# Patient Record
Sex: FEMALE | Race: WHITE | Employment: PART TIME
[De-identification: names, ages, dates, MRNs, and addresses within clinical notes are randomized per-mention and may not be internally consistent; named-entity substitution may affect disease eponyms.]

---

## 2017-06-24 ENCOUNTER — HEALTH MAINTENANCE LETTER (OUTPATIENT)
Age: 42
End: 2017-06-24

## 2017-07-31 ENCOUNTER — OFFICE VISIT (OUTPATIENT)
Dept: FAMILY MEDICINE | Facility: CLINIC | Age: 42
End: 2017-07-31
Payer: COMMERCIAL

## 2017-07-31 ENCOUNTER — MYC MEDICAL ADVICE (OUTPATIENT)
Dept: FAMILY MEDICINE | Facility: CLINIC | Age: 42
End: 2017-07-31

## 2017-07-31 VITALS
WEIGHT: 134.5 LBS | OXYGEN SATURATION: 98 % | HEART RATE: 86 BPM | RESPIRATION RATE: 16 BRPM | TEMPERATURE: 98.9 F | SYSTOLIC BLOOD PRESSURE: 113 MMHG | HEIGHT: 68 IN | DIASTOLIC BLOOD PRESSURE: 79 MMHG | BODY MASS INDEX: 20.38 KG/M2

## 2017-07-31 DIAGNOSIS — F41.9 ANXIETY: ICD-10-CM

## 2017-07-31 DIAGNOSIS — N92.0 MENORRHAGIA WITH REGULAR CYCLE: Primary | ICD-10-CM

## 2017-07-31 DIAGNOSIS — N92.0 MENORRHAGIA WITH REGULAR CYCLE: ICD-10-CM

## 2017-07-31 DIAGNOSIS — G47.00 INSOMNIA, UNSPECIFIED TYPE: ICD-10-CM

## 2017-07-31 DIAGNOSIS — Z23 ENCOUNTER FOR IMMUNIZATION: ICD-10-CM

## 2017-07-31 DIAGNOSIS — Z00.00 ROUTINE GENERAL MEDICAL EXAMINATION AT A HEALTH CARE FACILITY: Primary | ICD-10-CM

## 2017-07-31 PROCEDURE — 99213 OFFICE O/P EST LOW 20 MIN: CPT | Mod: 25 | Performed by: FAMILY MEDICINE

## 2017-07-31 PROCEDURE — 90471 IMMUNIZATION ADMIN: CPT | Performed by: FAMILY MEDICINE

## 2017-07-31 PROCEDURE — 99396 PREV VISIT EST AGE 40-64: CPT | Mod: 25 | Performed by: FAMILY MEDICINE

## 2017-07-31 PROCEDURE — 90715 TDAP VACCINE 7 YRS/> IM: CPT | Performed by: FAMILY MEDICINE

## 2017-07-31 NOTE — NURSING NOTE
Screening Questionnaire for Adult Immunization    Are you sick today?   No   Do you have allergies to medications, food, a vaccine component or latex?   No   Have you ever had a serious reaction after receiving a vaccination?   No   Do you have a long-term health problem with heart disease, lung disease, asthma, kidney disease, metabolic disease (e.g. diabetes), anemia, or other blood disorder?   No   Do you have cancer, leukemia, HIV/AIDS, or any other immune system problem?   No   In the past 3 months, have you taken medications that affect  your immune system, such as prednisone, other steroids, or anticancer drugs; drugs for the treatment of rheumatoid arthritis, Crohn s disease, or psoriasis; or have you had radiation treatments?   No   Have you had a seizure, or a brain or other nervous system problem?   No   During the past year, have you received a transfusion of blood or blood     products, or been given immune (gamma) globulin or antiviral drug?   No   For women: Are you pregnant or is there a chance you could become        pregnant during the next month?   No   Have you received any vaccinations in the past 4 weeks?   No     Immunization questionnaire answers were all negative.      MNVFC doesn't apply on this patient    Per orders of Dr. Roach, injection of Adacel given by Shannon Tena. Patient instructed to remain in clinic for 15 minutes afterwards, and to report any adverse reaction to me immediately.       Screening performed by Shannon Tena on 7/31/2017 at 2:49 PM.

## 2017-07-31 NOTE — PROGRESS NOTES
SUBJECTIVE:   CC: Samantha Meier is an 42 year old woman who presents for preventive health visit.     Physical   Annual:     Getting at least 3 servings of Calcium per day::  Yes    Bi-annual eye exam::  Yes    Dental care twice a year::  NO    Sleep apnea or symptoms of sleep apnea::  None    Diet::  Carbohydrate counting and Gluten-free/reduced    Frequency of exercise::  4-5 days/week    Duration of exercise::  45-60 minutes    Taking medications regularly::  Not Applicable    Additional concerns today::  YES (wants blood work)    Other non-preventive concerns to address:  1)  Would like hormone testing for estradiol and progesterone.  States that a nutritionist would like these to help her plan her diet.  She's seeking nutrition advice relating to anxiety and restless sleep.  She awakens unrefreshed.  She's also had changes in her menstrual pattern.  She used to have a 40-day cycle and now has a 28-day cycle with heavier (but regular) bleeding.  On heavy flow days may need to change pad or tampon every couple hours.      Today's PHQ-2 Score:   PHQ-2 ( 1999 Pfizer) 7/31/2017   Q1: Little interest or pleasure in doing things 1   Q2: Feeling down, depressed or hopeless 1   PHQ-2 Score 2   Q1: Little interest or pleasure in doing things Several days   Q2: Feeling down, depressed or hopeless Several days   PHQ-2 Score 2       Abuse: Current or Past(Physical, Sexual or Emotional)- No  Do you feel safe in your environment - Yes    Social History   Substance Use Topics     Smoking status: Never Smoker     Smokeless tobacco: Never Used     Alcohol use 0.6 - 1.8 oz/week     1 - 3 Standard drinks or equivalent per week      Comment: socially     The patient does not drink >3 drinks per day nor >7 drinks per week.    Reviewed orders with patient.  Reviewed health maintenance and updated orders accordingly - Yes      Pertinent mammograms are reviewed under the imaging tab.  History of abnormal Pap smear:   NO - age 30-65  PAP every 5 years with negative HPV co-testing recommended  Last 3 Pap Results:   PAP (no units)   Date Value   2013 NIL   HPV Negative 2013    Reviewed and updated as needed this visit by clinical staff  Tobacco  Allergies  Meds  Med Hx  Surg Hx  Fam Hx  Soc Hx        Reviewed and updated as needed this visit by Provider  Tobacco  Meds  Med Hx  Surg Hx  Fam Hx  Soc Hx       Past Medical History:   Diagnosis Date     Abnormal Pap smear of cervix     Minor dysplasia s/p cryotherapy     Cat scratch fever     by LN biopsy      Past Surgical History:   Procedure Laterality Date     LYMPH NODE BIOPSY      cat scratch disease     Obstetric History       T1      L1     SAB0   TAB0   Ectopic0   Multiple0   Live Births0       # Outcome Date GA Lbr Kg/2nd Weight Sex Delivery Anes PTL Lv   2 SAB            1 Term                   ROS:  CONST: NEGATIVE for fevers/chills/sweats, unexplained weight loss/gain, and fatigue  EYES: NEGATIVE for change in vision  ENT: NEGATIVE for difficulty hearing/tinnitus, and problems with teeth/gums  BREAST: NEGATIVE for breast lump/discharge  CV: NEGATIVE for chest pain/discomfort, leg pain with exercise, and palpitations  RESP: NEGATIVE for cough/wheeze, and difficulty breathing  GI: NEGATIVE for abdominal pain, blood in bowel movement, and nausea/vomiting/diarrhea  : NEGATIVE for nighttime urination, leaking urine, unusual vaginal bleeding, penile/vaginal discharge, and concerns about sexual function  MS: NEGATIVE for muscle/joint pain  SKIN: NEGATIVE for rash or mole change  NEURO: NEGATIVE for headache, dizziness/lightheadedness, numbness, memory loss, and loss of coordination  PSYCH: POSITIVE for anxiety/stress and problems with sleep, and NEGATIVE for depression  HEME: NEGATIVE for unexplained lumps, and easy bruising/bleeding  ENDO: NEGATIVE for excessive thirst or urination  ALL: NEGATIVE for hay fever/allergies      OBJECTIVE:   BP  "113/79 (BP Location: Left arm, Patient Position: Chair, Cuff Size: Adult Regular)  Pulse 86  Temp 98.9  F (37.2  C) (Oral)  Resp 16  Ht 5' 8\" (1.727 m)  Wt 134 lb 8 oz (61 kg)  LMP 07/20/2017  SpO2 98%  Breastfeeding? No  BMI 20.45 kg/m2  EXAM:  GENERAL: healthy, alert and no distress  EYES: Eyes grossly normal to inspection, PERRL and conjunctivae and sclerae normal  HENT: ear canals and TM's normal, nose and mouth without ulcers or lesions  NECK: no adenopathy, no asymmetry, masses, or scars and thyroid normal to palpation  RESP: lungs clear to auscultation - no rales, rhonchi or wheezes  BREAST: normal without masses, tenderness or nipple discharge and no palpable axillary masses or adenopathy  CV: regular rate and rhythm, normal S1 S2, no S3 or S4, no murmur, click or rub, no peripheral edema and peripheral pulses strong  ABDOMEN: soft, nontender, no hepatosplenomegaly, no masses and bowel sounds normal  MS: no gross musculoskeletal defects noted, no edema  SKIN: no suspicious lesions or rashes  NEURO: Normal strength and tone, mentation intact and speech normal  PSYCH: mentation appears normal, affect normal/bright    ASSESSMENT/PLAN:     1. Routine general medical examination at a health care facility    2. Insomnia, unspecified type  3. Anxiety  Discussed that I'm not familiar with how to interpret estradiol/progesterone levels in this setting.  Discussed that the levels of each of these hormones do vary quite a bit during a cycle and that for fertility testing (to test for ovulatory cycle) these would be done on different days of the menstrual cycle (typically Day 3 for estradiol and Day 21 for progesterone).  We also discussed mental health and she is considering therapy but would want to see a specific therapist based on recommendations.  I did discussed that Mague Matamoros, [facility] [place] [facility], is a mental health resource available to her as well.  She plans to get back to me if " she'd like to pursue the hormone testing.  We may also consider TSH testing.      4. Menorrhagia with regular cycle  Discussed that her bleeding sounds to be within normal range of menstrual variation.  However, I did review that in the case of increasingly heavy or frequent bleeding I would recommend further workup of the endometrium - usually with ultrasound.      5. Encounter for immunization  - TDAP VACCINE (ADACEL)  - ADMIN 1st VACCINE     COUNSELING:  Reviewed preventive health counseling, as reflected in patient instructions      Counseling Resources:  ATP IV Guidelines  Pooled Cohorts Equation Calculator  Breast Cancer Risk Calculator  FRAX Risk Assessment  ICSI Preventive Guidelines  Dietary Guidelines for Americans, 2010  USDA's MyPlate  ASA Prophylaxis  Lung CA Screening    Cynthia Roach MD  [facility] [place]

## 2017-07-31 NOTE — MR AVS SNAPSHOT
After Visit Summary   7/31/2017    Samantha Meier    MRN: [id number]           Patient Information     Date Of Birth          1975        Visit Information        Provider Department      7/31/2017 2:00 PM Cynthia Roach MD [facility]        Today's Diagnoses     Routine general medical examination at a health care facility    -  1    Insomnia, unspecified type        Anxiety        Menorrhagia with regular cycle        Encounter for immunization          Care Instructions      Preventive Health Recommendations  Female Ages 40 to 49    Yearly exam:     See your health care provider every year in order to  1. Review health changes.   2. Discuss preventive care.    3. Review your medicines if your doctor prescribed any.      Get a Pap test every three years (unless you have an abnormal result and your provider advises testing more often).      If you get Pap tests with HPV test, you only need to test every 5 years, unless you have an abnormal result. You do not need a Pap test if your uterus was removed (hysterectomy) and you have not had cancer.      You should be tested each year for STDs (sexually transmitted diseases), if you're at risk.       Ask your doctor if you should have a mammogram.      Have a colonoscopy (test for colon cancer) if someone in your family has had colon cancer or polyps before age 50.       Have a cholesterol test every 5 years.       Have a diabetes test (fasting glucose) after age 45. If you are at risk for diabetes, you should have this test every 3 years.    Shots: Get a flu shot each year. Get a tetanus shot every 10 years.     Nutrition:     Eat at least 5 servings of fruits and vegetables each day.    Eat whole-grain bread, whole-wheat pasta and brown rice instead of white grains and rice.    Talk to your provider about Calcium and Vitamin D.     Lifestyle    Exercise at least 150 minutes a week (an average of 30 minutes a day, 5 days a week). This  "will help you control your weight and prevent disease.    Limit alcohol to one drink per day.    No smoking.     Wear sunscreen to prevent skin cancer.    See your dentist every six months for an exam and cleaning.          Follow-ups after your visit        Who to contact     If you have questions or need follow up information about today's clinic visit or your schedule please contact [facility] directly at [phone].  Normal or non-critical lab and imaging results will be communicated to you by [email or web address], letter or phone within 4 business days after the clinic has received the results. If you do not hear from us within 7 days, please contact the clinic through [email or web address] or phone. If you have a critical or abnormal lab result, we will notify you by phone as soon as possible.  Submit refill requests through [employer] or call your pharmacy and they will forward the refill request to us. Please allow 3 business days for your refill to be completed.          Additional Information About Your Visit        [email or web address] Information     [employer] lets you send messages to your doctor, view your test results, renew your prescriptions, schedule appointments and more. To sign up, go to [email or web address] . Click on \"Log in\" on the left side of the screen, which will take you to the Welcome page. Then click on \"Sign up Now\" on the right side of the page.     You will be asked to enter the access code listed below, as well as some personal information. Please follow the directions to create your username and password.     Your access code is: [email or web address]  Expires: 10/29/2017  2:44 PM     Your access code will  in 90 days. If you need help or a new code, please call your [facility] clinic or [phone].        Care EveryWhere ID     This is your Care EveryWhere ID. This could be used by other organizations to access your [facility] medical records  [id number]        Your Vitals Were     Pulse Temperature " "Respirations Height Last Period Pulse Oximetry    86 98.9  F (37.2  C) (Oral) 16 5' 8\" (1.727 m) 07/20/2017 98%    Breastfeeding? BMI (Body Mass Index)                No 20.45 kg/m2           Blood Pressure from Last 3 Encounters:   07/31/17 136/90   11/18/14 131/88   07/18/13 118/84    Weight from Last 3 Encounters:   07/31/17 134 lb 8 oz (61 kg)   11/18/14 133 lb 6.4 oz (60.5 kg)   07/18/13 129 lb (58.5 kg)              We Performed the Following     TDAP VACCINE (ADACEL)        Primary Care Provider Office Phone # Fax #    Miya Susy Bradshaw [phone]       [facility] [address] [facility] [address]        Equal Access to Services     [facility] : Hadii aad ku hadasho Soomaali, waaxda luqadaha, qaybta kaalmada adeegyada, giovanni baig hayaan saundra wilkins . So [facility] [phone].    ATENCIÓN: Si habla español, tiene a flores disposición servicios gratuitos de asistencia lingüística. Llame al [phone].    We comply with applicable federal civil rights laws and [place] laws. We do not discriminate on the basis of race, color, national origin, age, disability sex, sexual orientation or gender identity.            Thank you!     Thank you for choosing [facility]  for your care. Our goal is always to provide you with excellent care. Hearing back from our patients is one way we can continue to improve our services. Please take a few minutes to complete the written survey that you may receive in the mail after your visit with us. Thank you!             Your Updated Medication List - Protect others around you: Learn how to safely use, store and throw away your medicines at www.disposemymeds.org.      Notice  As of 7/31/2017  2:44 PM    You have not been prescribed any medications.      "

## 2017-07-31 NOTE — TELEPHONE ENCOUNTER
Routing to provider - Doc - please review and advise as appropriate  1. Order request:  Progesterone & Estradiol - on day 21 of menstruation cycle  2. Last office visit 7/31/2017   Menorrhagia with regular cycle - advised within normal range of menstrual variation      Thank you,  Kathleen Whitaker RN

## 2018-09-22 ENCOUNTER — HEALTH MAINTENANCE LETTER (OUTPATIENT)
Age: 43
End: 2018-09-22

## 2018-12-20 ENCOUNTER — OFFICE VISIT (OUTPATIENT)
Dept: FAMILY MEDICINE | Facility: CLINIC | Age: 43
End: 2018-12-20
Payer: COMMERCIAL

## 2018-12-20 VITALS
BODY MASS INDEX: 20.27 KG/M2 | SYSTOLIC BLOOD PRESSURE: 132 MMHG | RESPIRATION RATE: 16 BRPM | HEART RATE: 64 BPM | OXYGEN SATURATION: 100 % | WEIGHT: 133.75 LBS | HEIGHT: 68 IN | DIASTOLIC BLOOD PRESSURE: 88 MMHG | TEMPERATURE: 99 F

## 2018-12-20 DIAGNOSIS — Z00.00 ROUTINE GENERAL MEDICAL EXAMINATION AT A HEALTH CARE FACILITY: Primary | ICD-10-CM

## 2018-12-20 DIAGNOSIS — N63.0 LUMP OR MASS IN BREAST: ICD-10-CM

## 2018-12-20 DIAGNOSIS — Z30.9 ENCOUNTER FOR CONTRACEPTIVE MANAGEMENT, UNSPECIFIED TYPE: ICD-10-CM

## 2018-12-20 PROCEDURE — 99213 OFFICE O/P EST LOW 20 MIN: CPT | Mod: 25 | Performed by: FAMILY MEDICINE

## 2018-12-20 PROCEDURE — 99396 PREV VISIT EST AGE 40-64: CPT | Performed by: FAMILY MEDICINE

## 2018-12-20 PROCEDURE — G0145 SCR C/V CYTO,THINLAYER,RESCR: HCPCS | Performed by: FAMILY MEDICINE

## 2018-12-20 PROCEDURE — 87624 HPV HI-RISK TYP POOLED RSLT: CPT | Performed by: FAMILY MEDICINE

## 2018-12-20 ASSESSMENT — ENCOUNTER SYMPTOMS
BREAST MASS: 1
CONSTIPATION: 0
FREQUENCY: 0
ABDOMINAL PAIN: 0
HEADACHES: 0
JOINT SWELLING: 0
FEVER: 0
DIARRHEA: 0
DYSURIA: 0
PARESTHESIAS: 0
HEMATOCHEZIA: 0
COUGH: 0
MYALGIAS: 0
SHORTNESS OF BREATH: 0
DIZZINESS: 0
CHILLS: 0
ARTHRALGIAS: 0
HEARTBURN: 0
HEMATURIA: 0
NERVOUS/ANXIOUS: 0
EYE PAIN: 0
PALPITATIONS: 0
SORE THROAT: 0
NAUSEA: 0
WEAKNESS: 0

## 2018-12-20 ASSESSMENT — MIFFLIN-ST. JEOR: SCORE: 1320.19

## 2018-12-20 NOTE — PROGRESS NOTES
SUBJECTIVE:   CC: Samantha Meier is an 43 year old woman who presents for preventive health visit.     Physical   Annual:     Getting at least 3 servings of Calcium per day:  Yes    Bi-annual eye exam:  Yes    Dental care twice a year:  NO    Sleep apnea or symptoms of sleep apnea:  None    Diet:  Carbohydrate counting and Gluten-free/reduced    Frequency of exercise:  4-5 days/week    Duration of exercise:  45-60 minutes    Taking medications regularly:  Yes    Medication side effects:  Not applicable    Additional concerns today:  Yes    PHQ-2 Total Score: 1      Medical assistant advised patient about addressing additional health concerns that could be billed, as it is not considered a part of a preventive physical. Patient verbalized understanding.    Cynthia Roach MD on 12/20/2018 at 10:32 AM  Consult on getting IUD    - DECLINE FLU VACCINE       Other non-preventive concerns to address:  1)  Lump in left breast - outer upper breast.  She has a history of fibrocystic changes but this lump feels more prominent.  Has been present for a couple months without change.  No FHX of breast cancer.  Mother had ovarian cancer.      Today's PHQ-2 Score:   PHQ-2 ( 1999 Pfizer) 12/20/2018   Q1: Little interest or pleasure in doing things 0   Q2: Feeling down, depressed or hopeless 0   PHQ-2 Score 0   Q1: Little interest or pleasure in doing things Not at all   Q2: Feeling down, depressed or hopeless Several days   PHQ-2 Score 1       Abuse: Current or Past(Physical, Sexual or Emotional)- No  Do you feel safe in your environment? Yes    Social History     Tobacco Use     Smoking status: Never Smoker     Smokeless tobacco: Never Used   Substance Use Topics     Alcohol use: Yes     Alcohol/week: 0.6 - 1.8 oz     Types: 1 - 3 Standard drinks or equivalent per week     Comment: socially     Alcohol Use 12/20/2018   If you drink alcohol do you typically have greater than 3 drinks per day OR greater than 7 drinks per week?  No       Reviewed orders with patient.  Reviewed health maintenance and updated orders accordingly - Yes  BP Readings from Last 3 Encounters:   12/20/18 132/88   07/31/17 113/79   11/18/14 131/88    Wt Readings from Last 3 Encounters:   12/20/18 60.7 kg (133 lb 12 oz)   07/31/17 61 kg (134 lb 8 oz)   11/18/14 60.5 kg (133 lb 6.4 oz)          Pertinent mammograms are reviewed under the imaging tab.  History of abnormal Pap smear: YES - updated in Problem List and Health Maintenance accordingly  PAP / HPV 7/18/2013   PAP NIL   HPVSUR RESULT Negative  Reference range: Negative  (Note)  INTERPRETIVE INFORMATION: HPV DNA Probe, High Risk, SurePath    The performance characteristics of HPV testing using the  SurePath sample medium were determined by [facility]  in a validation study. The FDA has not approved the  SurePath sample medium for HPV testing. Specimens collected  in SurePath sample medium may produce false-negative  results under certain conditions, e.g., when specimens  exceed stability requirements. For HPV results using an  FDA-approved test, laboratories should collect and  transport specimens according to the instructions of  FDA-approved kits (e.g., ThinPrep medium or HPV Digene  collection kit).    This test detects the high-risk HPV genotypes 16, 18, 31,  33, 35, 39, 45, 51, 52, 56, 58, 59, and 68 associated with  cervical cancer and its precursor lesions. However,  cross-reactions with other genotypes may occur. Results  should be correlated with cytologic and histologic  findings. Sensitivity may be affected by cellularity of  specimen.    This test is intended for medical purposes only and is not  valid for the evaluation of suspected sexual abuse or for  other forensic purposes.    HPV testing should not be used for screening or management  of atypical squamous cells of undetermined significance  (ASCUS) in women under age 21.    Test developed and characteristics determined by  [facility]. See Compliance Statement B: [employer]  Performed by [facility],  [address] [place] [address] [phone]  [email or web address], Maurisio Ryan MD, Lab. Director     Reviewed and updated as needed this visit by clinical staff  Tobacco  Allergies  Meds  Med Hx  Surg Hx  Fam Hx  Soc Hx        Reviewed and updated as needed this visit by Provider  Tobacco  Meds  Med Hx  Surg Hx  Fam Hx  Soc Hx       Past Medical History:   Diagnosis Date     Abnormal Pap smear of cervix     Minor dysplasia s/p cryotherapy     Cat scratch fever     by LN biopsy      Past Surgical History:   Procedure Laterality Date     LYMPH NODE BIOPSY      cat scratch disease     Obstetric History       T1      L1     SAB1   TAB0   Ectopic0   Multiple0   Live Births0       # Outcome Date GA Lbr Kg/2nd Weight Sex Delivery Anes PTL Lv   2 SAB            1 Term                   Review of Systems   Constitutional: Negative for chills and fever.   HENT: Negative for congestion, ear pain, hearing loss and sore throat.    Eyes: Negative for pain and visual disturbance.   Respiratory: Negative for cough and shortness of breath.    Cardiovascular: Negative for chest pain, palpitations and peripheral edema.   Gastrointestinal: Negative for abdominal pain, constipation, diarrhea, heartburn, hematochezia and nausea.   Breasts:  Positive for breast mass. Negative for tenderness and discharge.   Genitourinary: Negative for dysuria, frequency, genital sores, hematuria, pelvic pain, urgency, vaginal bleeding and vaginal discharge.   Musculoskeletal: Negative for arthralgias, joint swelling and myalgias.   Skin: Negative for rash.   Neurological: Negative for dizziness, weakness, headaches and paresthesias.   Psychiatric/Behavioral: Negative for mood changes. The patient is not nervous/anxious.           OBJECTIVE:   /88 (BP Location: Left arm, Patient Position: Chair, Cuff Size: Adult  "Regular)   Pulse 64   Temp 99  F (37.2  C) (Oral)   Resp 16   Ht 1.727 m (5' 8\")   Wt 60.7 kg (133 lb 12 oz)   LMP 12/18/2018 (Exact Date)   SpO2 100%   BMI 20.34 kg/m    Physical Exam  GENERAL: healthy, alert and no distress  EYES: Eyes grossly normal to inspection, PERRL and conjunctivae and sclerae normal  HENT: ear canals and TM's normal, nose and mouth without ulcers or lesions  NECK: no adenopathy, no asymmetry, masses, or scars and thyroid normal to palpation  RESP: lungs clear to auscultation - no rales, rhonchi or wheezes  BREAST: normal without masses, tenderness or nipple discharge and no palpable axillary masses or adenopathy  BREAST: no palpable axillary masses or adenopathy and fibrocystic changes bilaterally, especially in upper outer quadrants and with prominent pea-sized lump at approx 2:00 on left breast  CV: regular rate and rhythm, normal S1 S2, no S3 or S4, no murmur, click or rub, no peripheral edema and peripheral pulses strong  ABDOMEN: soft, nontender, no hepatosplenomegaly, no masses and bowel sounds normal  MS: no gross musculoskeletal defects noted, no edema  SKIN: no suspicious lesions or rashes  NEURO: Normal strength and tone, mentation intact and speech normal  PSYCH: mentation appears normal, affect normal/bright      ASSESSMENT/PLAN:     1. Routine general medical examination at a health care facility  - Lipid panel reflex to direct LDL Fasting; Future  - **Glucose FUTURE anytime; Future  - Pap imaged thin layer screen with HPV - recommended age 30 - 65 years (select HPV order below)  - HPV High Risk Types DNA Cervical    2. Lump or mass in breast  Fibrocystic changes bilaterally but this area feels more prominent - I recommended diagnostic mammo + US.  - MA Diagnostic Digital Bilateral; Future  - US Breast Left Limited 1-3 Quadrants; Future    3. Encounter for contraceptive management, unspecified type  She's interested in an IUD.  Originally felt she wanted a copper IUD " "but notes that her sister and daughter have had reactions to copper.  Now she's leaning towards a low-dose progesterone IUD and we discussed Crystal vs Mirena vs Kyleena.  I referred her to [facility] for further discussion of IUD options and insertion.  - OB/GYN REFERRAL    COUNSELING:  Reviewed preventive health counseling, as reflected in patient instructions    BP Readings from Last 1 Encounters:   12/20/18 132/88     Estimated body mass index is 20.34 kg/m  as calculated from the following:    Height as of this encounter: 1.727 m (5' 8\").    Weight as of this encounter: 60.7 kg (133 lb 12 oz).    BP Screening:   Last 3 BP Readings:    BP Readings from Last 3 Encounters:   12/20/18 132/88   07/31/17 113/79   11/18/14 131/88       The following was recommended to the patient:  Re-screen BP within a year and recommended lifestyle modifications      Counseling Resources:  ATP IV Guidelines  Pooled Cohorts Equation Calculator  Breast Cancer Risk Calculator  FRAX Risk Assessment  ICSI Preventive Guidelines  Dietary Guidelines for Americans, 2010  USDA's MyPlate  ASA Prophylaxis  Lung CA Screening    Cynthia Roach MD  [facility]  "

## 2018-12-20 NOTE — PATIENT INSTRUCTIONS
Schedule a fasting lab-only appointment.  Fast for 10 hours prior to labs: nothing to eat or drink except plain water and your pills for ten hours prior to appointment.  In addition, avoid fatty foods and alcohol for 24 hours prior to appointment.      Call [facility] appointment line [phone] to schedule mammo and ultrasound.      Preventive Health Recommendations  Female Ages 40 to 49    Yearly exam:     See your health care provider every year in order to  1. Review health changes.   2. Discuss preventive care.    3. Review your medicines if your doctor prescribed any.      Get a Pap test every three years (unless you have an abnormal result and your provider advises testing more often).      If you get Pap tests with HPV test, you only need to test every 5 years, unless you have an abnormal result. You do not need a Pap test if your uterus was removed (hysterectomy) and you have not had cancer.      You should be tested each year for STDs (sexually transmitted diseases), if you're at risk.     Ask your doctor if you should have a mammogram.      Have a colonoscopy (test for colon cancer) if someone in your family has had colon cancer or polyps before age 50.       Have a cholesterol test every 5 years.       Have a diabetes test (fasting glucose) after age 45. If you are at risk for diabetes, you should have this test every 3 years.    Shots: Get a flu shot each year. Get a tetanus shot every 10 years.     Nutrition:     Eat at least 5 servings of fruits and vegetables each day.    Eat whole-grain bread, whole-wheat pasta and brown rice instead of white grains and rice.    Get adequate Calcium and Vitamin D.      Lifestyle    Exercise at least 150 minutes a week (an average of 30 minutes a day, 5 days a week). This will help you control your weight and prevent disease.    Limit alcohol to one drink per day.    No smoking.     Wear sunscreen to prevent skin cancer.    See your dentist  every six months for an exam and cleaning.

## 2018-12-20 NOTE — LETTER
January 3, 2019    Samantha Meier  [address] [facility] [id number]    Dear Samantha,  We are happy to inform you that your PAP smear result from 12/20/18 is normal.  We are now able to do a follow up test on PAP smears. The DNA test is for HPV (Human Papilloma Virus). Cervical cancer is closely linked with certain types of HPV. Your results showed no evidence of high risk HPV.  Therefore we recommend you return in 5 years for your next pap smear and HPV test.  You will still need to return to the clinic every year for an annual exam and other preventive tests.  If you have additional questions regarding this result, please call our registered nurse, Cinthia at [phone].  Sincerely,    Cynthia Roach MD/[facility]

## 2018-12-26 LAB
COPATH REPORT: NORMAL
PAP: NORMAL

## 2018-12-27 LAB
FINAL DIAGNOSIS: NORMAL
HPV HR 12 DNA CVX QL NAA+PROBE: NEGATIVE
HPV16 DNA SPEC QL NAA+PROBE: NEGATIVE
HPV18 DNA SPEC QL NAA+PROBE: NEGATIVE
SPECIMEN DESCRIPTION: NORMAL
SPECIMEN SOURCE CVX/VAG CYTO: NORMAL

## 2019-01-02 DIAGNOSIS — Z00.00 ROUTINE GENERAL MEDICAL EXAMINATION AT A HEALTH CARE FACILITY: ICD-10-CM

## 2019-01-02 LAB
CHOLEST SERPL-MCNC: 215 MG/DL
GLUCOSE SERPL-MCNC: 92 MG/DL (ref 70–99)
HDLC SERPL-MCNC: 114 MG/DL
LDLC SERPL CALC-MCNC: 90 MG/DL
NONHDLC SERPL-MCNC: 101 MG/DL
TRIGL SERPL-MCNC: 56 MG/DL

## 2019-01-02 PROCEDURE — 80061 LIPID PANEL: CPT | Performed by: FAMILY MEDICINE

## 2019-01-02 PROCEDURE — 36415 COLL VENOUS BLD VENIPUNCTURE: CPT | Performed by: FAMILY MEDICINE

## 2019-01-02 PROCEDURE — 82947 ASSAY GLUCOSE BLOOD QUANT: CPT | Performed by: FAMILY MEDICINE

## 2019-01-03 NOTE — RESULT ENCOUNTER NOTE
Paulie Singh,  This looks great!  Your total cholesterol is a bit elevated but that's primarily because your HDL (good) cholesterol is so fantastic.      Cynthia Roach MD

## 2019-01-07 ENCOUNTER — ANCILLARY PROCEDURE (OUTPATIENT)
Dept: MAMMOGRAPHY | Facility: CLINIC | Age: 44
End: 2019-01-07
Attending: FAMILY MEDICINE
Payer: COMMERCIAL

## 2019-01-07 DIAGNOSIS — N63.0 LUMP OR MASS IN BREAST: ICD-10-CM

## 2019-01-08 ENCOUNTER — OFFICE VISIT (OUTPATIENT)
Dept: MIDWIFE SERVICES | Facility: CLINIC | Age: 44
End: 2019-01-08
Payer: COMMERCIAL

## 2019-01-08 VITALS
HEART RATE: 70 BPM | BODY MASS INDEX: 20.53 KG/M2 | WEIGHT: 135 LBS | DIASTOLIC BLOOD PRESSURE: 87 MMHG | SYSTOLIC BLOOD PRESSURE: 136 MMHG

## 2019-01-08 DIAGNOSIS — Z30.430 ENCOUNTER FOR IUD INSERTION: Primary | ICD-10-CM

## 2019-01-08 DIAGNOSIS — Z97.5 IUD (INTRAUTERINE DEVICE) IN PLACE: ICD-10-CM

## 2019-01-08 LAB — BETA HCG QUAL IFA URINE: NEGATIVE

## 2019-01-08 PROCEDURE — 84703 CHORIONIC GONADOTROPIN ASSAY: CPT | Performed by: ADVANCED PRACTICE MIDWIFE

## 2019-01-08 PROCEDURE — 58300 INSERT INTRAUTERINE DEVICE: CPT | Performed by: ADVANCED PRACTICE MIDWIFE

## 2019-01-08 NOTE — PROGRESS NOTES
S; pt wondering about IUD wants to discuss paragard v.  Mirena pt states has heavy long crampy periods lasting 7 days and heavy for 2 days with cramps.    Pt also had a sister with reaction to copper IUD.    Discussed and pt decided Mirena IUD and would like placed today.    O: bimanual uterus anteverted      Chief Complaint/ History of Present Illness:Samantha Meier is a 43 year old  female.   Patient's last menstrual period was 12/18/2018 (exact date)..  Today's pregnancy test negative.  She is here for an IUD insertion.  Patient has been given written information.  I have reviewed the risks of the IUD including pregnancy, PID, life threatening infection, perforation, expulsion, cramping, changes in bleeding and ovarian cysts. Benefits of the IUD and alternative family planning methods have been discussed.  Patients questions are answered.  Patient has verbalized understanding of risks and benefits and has signed the consent form.  No Known Allergies  No current outpatient medications on file.      Past Medical History:   Diagnosis Date     Abnormal Pap smear of cervix 1996    Minor dysplasia s/p cryotherapy     Cat scratch fever 2001    by LN biopsy     Past Surgical History:   Procedure Laterality Date     LYMPH NODE BIOPSY  2001    cat scratch disease     REVIEW OF SYSTEMS  CONSTITUTIONAL: Denies fever, chills and night sweats  BREASTS:  Denies discharge and lumps.    HEART/LUNGS: Denies dyspnea, wheezing, coughing and chest pain.  HEMATOLOGIC: Denies tendency to bruise and history of blood clots.  GENITOURINARY:  Denies urgency, dysuria and hematuria.  NEUROLOGIC:  Denies seizures, weakness and fainting.  PSYCHIATRIC: Negative for changes in mood or affect    EXAM:  /87   Pulse 70   Wt 61.2 kg (135 lb)   LMP 12/18/2018 (Exact Date)   BMI 20.53 kg/m    Abdomen: soft, nontender, without hepatosplenomegaly or masses  : normal cervix, adnexae, and uterus without masses or discharge  IUD type:  Mirena  Lot # [id number]  MIRENA: NDC# 24848-159-37    Procedure:  Uterus assessed for position and is Anteverted.  Speculum inserted.  Betadine prep of cervix done.  Tenaculum applied at 12 o'clock position and gentle traction was applied to elongate the cervical canal.  Uterus sounded to 8 cm's.  Cervical dilators not used .   IUD inserted in the usual fashion without problems, ie: resistance, severe protracted pain or excessive bleeding.  Tenaculum was removed with scant bleeding from the tenaculum site that was managed with some direct pressure using Garner swabs.  Strings trimmed to 2 cm's.  Patient tolerated the procedure very  well without any prolonged pain or syncopy.    ASSESSMENT/ PLAN:  (Z30.430) Encounter for IUD insertion  (primary encounter diagnosis)    Plan: Beta HCG qual IFA urine        Negative     GC/Chlamydia Screening:  NO     Instructions given to patient regarding checking IUD strings, returning to the clinic if pain or inability to check strings and/or irregular bleeding.  Pt to RTC in 4-6 weeks for IUD check if needed.   Discussed warning signs.    rtc as needed.   Deyanira Laird CNM

## 2019-01-08 NOTE — NURSING NOTE
"Chief Complaint   Patient presents with     Consult       Initial /87   Pulse 70   Wt 61.2 kg (135 lb)   LMP 2018 (Exact Date)   BMI 20.53 kg/m   Estimated body mass index is 20.53 kg/m  as calculated from the following:    Height as of 18: 1.727 m (5' 8\").    Weight as of this encounter: 61.2 kg (135 lb).  BP completed using cuff size: regular    Questioned patient about current smoking habits.  Pt. has never smoked.          The following HM Due: NONE      The following patient reported/Care Every where data was sent to:  P ABSTRACT QUALITY INITIATIVES [[id number]]        N/a      [place] MA                "

## 2019-10-03 ENCOUNTER — HEALTH MAINTENANCE LETTER (OUTPATIENT)
Age: 44
End: 2019-10-03

## 2020-03-22 ENCOUNTER — HEALTH MAINTENANCE LETTER (OUTPATIENT)
Age: 45
End: 2020-03-22

## 2020-06-03 ENCOUNTER — NURSE TRIAGE (OUTPATIENT)
Dept: NURSING | Facility: CLINIC | Age: 45
End: 2020-06-03

## 2020-06-03 DIAGNOSIS — N64.4 BREAST PAIN, LEFT: Primary | ICD-10-CM

## 2020-06-03 NOTE — TELEPHONE ENCOUNTER
"Patient reports she had a mammogram about one year ago ordered by Dr Roach. Patient has some \"lumpy stuff on left side which goes into armpit\" Patient was told this is breast tissue in the armpit.     Patient reports for the last couple of weeks this same tissue on the left side is more tender and sore. She feels like there is more breast tissue in the pit of the armpit.     Patient wants to know what Dr Roach recommends? Should she get another mammogram?    Please follow up with patient.     Kaylie Cifuentes RN/[facility]    Reason for Disposition    [1] Caller requesting NON-URGENT health information AND [2] PCP's office is the best resource    Additional Information    Negative: RN needs further essential information from caller in order to complete triage    Negative: Requesting regular office appointment    Protocols used: INFORMATION ONLY CALL-A-AH      "

## 2020-06-04 NOTE — TELEPHONE ENCOUNTER
Writer called patient and reviewed recommendation per Dr. Roach.    Patient concerned about cost stating last year she was charged $800 for diagnostic mammogram and US.  Patient asked if screening mammogram could be ordered and writer explained that since patient is experiencing symptoms a diagnostic mammogram and ultrasound is what is ordered.    Writer recommended:  1. Contact [facility] Cost of Care Estimate line for a good agustina estimate of diagnostic mammogram and ultrasound.  Reviewed this phone number  2. Could also check and see if [facility] performs breast imaging studies and if so, orders can be faxed to [facility].  Writer knows of MRI studies being done for other patients through [facility] but does not know for sure if [facility] offers breast imaging studies.  3. Reviewed mammogram scheduling number should patient choose to schedule with [facility] MACKENZIE LindsayN, RN

## 2020-06-04 NOTE — TELEPHONE ENCOUNTER
Dr. Roach-Please review and advise/sign if agree.    Diagnostic mammogram and ultrasound pended.    Thank you!  MACKENZIE DietrichN, RN

## 2020-11-25 ENCOUNTER — E-VISIT (OUTPATIENT)
Dept: FAMILY MEDICINE | Facility: CLINIC | Age: 45
End: 2020-11-25
Payer: COMMERCIAL

## 2020-11-25 DIAGNOSIS — R45.86 MOOD CHANGES: Primary | ICD-10-CM

## 2020-11-25 PROCEDURE — 99207 PR NO BILLABLE SERVICE THIS VISIT: CPT | Performed by: FAMILY MEDICINE

## 2020-11-25 ASSESSMENT — ANXIETY QUESTIONNAIRES
4. TROUBLE RELAXING: MORE THAN HALF THE DAYS
5. BEING SO RESTLESS THAT IT IS HARD TO SIT STILL: NOT AT ALL
1. FEELING NERVOUS, ANXIOUS, OR ON EDGE: MORE THAN HALF THE DAYS
GAD7 TOTAL SCORE: 11
7. FEELING AFRAID AS IF SOMETHING AWFUL MIGHT HAPPEN: SEVERAL DAYS
2. NOT BEING ABLE TO STOP OR CONTROL WORRYING: MORE THAN HALF THE DAYS
7. FEELING AFRAID AS IF SOMETHING AWFUL MIGHT HAPPEN: SEVERAL DAYS
6. BECOMING EASILY ANNOYED OR IRRITABLE: MORE THAN HALF THE DAYS
GAD7 TOTAL SCORE: 11
GAD7 TOTAL SCORE: 11
3. WORRYING TOO MUCH ABOUT DIFFERENT THINGS: MORE THAN HALF THE DAYS

## 2020-11-25 ASSESSMENT — PATIENT HEALTH QUESTIONNAIRE - PHQ9
SUM OF ALL RESPONSES TO PHQ QUESTIONS 1-9: 16
SUM OF ALL RESPONSES TO PHQ QUESTIONS 1-9: 16
10. IF YOU CHECKED OFF ANY PROBLEMS, HOW DIFFICULT HAVE THESE PROBLEMS MADE IT FOR YOU TO DO YOUR WORK, TAKE CARE OF THINGS AT HOME, OR GET ALONG WITH OTHER PEOPLE: VERY DIFFICULT

## 2020-11-26 ASSESSMENT — PATIENT HEALTH QUESTIONNAIRE - PHQ9: SUM OF ALL RESPONSES TO PHQ QUESTIONS 1-9: 16

## 2020-11-26 ASSESSMENT — ANXIETY QUESTIONNAIRES: GAD7 TOTAL SCORE: 11

## 2020-11-28 NOTE — TELEPHONE ENCOUNTER
Provider E-Visit time total (minutes): n/a    PHQ 11/25/2020 11/27/2020   PHQ-9 Total Score 16 11   Q9: Thoughts of better off dead/self-harm past 2 weeks Not at all Not at all     [facility] Follow-up to PHQ 11/25/2020 11/27/2020   PHQ-9 9. Suicide Ideation past 2 weeks Not at all Not at all     SHAHZAD-7 SCORE 11/25/2020 11/27/2020   Total Score 11 (moderate anxiety) 12 (moderate anxiety)   Total Score 11 12

## 2020-11-30 ENCOUNTER — HOSPITAL ENCOUNTER (OUTPATIENT)
Dept: BEHAVIORAL HEALTH | Facility: CLINIC | Age: 45
Discharge: HOME | End: 2020-11-30
Attending: FAMILY MEDICINE | Admitting: FAMILY MEDICINE
Payer: COMMERCIAL

## 2020-11-30 PROCEDURE — 90791 PSYCH DIAGNOSTIC EVALUATION: CPT | Mod: 95 | Performed by: PSYCHOLOGIST

## 2020-11-30 ASSESSMENT — COLUMBIA-SUICIDE SEVERITY RATING SCALE - C-SSRS
2. HAVE YOU ACTUALLY HAD ANY THOUGHTS OF KILLING YOURSELF LIFETIME?: NO
4. HAVE YOU HAD THESE THOUGHTS AND HAD SOME INTENTION OF ACTING ON THEM?: NO
4. HAVE YOU HAD THESE THOUGHTS AND HAD SOME INTENTION OF ACTING ON THEM?: NO
2. HAVE YOU ACTUALLY HAD ANY THOUGHTS OF KILLING YOURSELF?: NO
TOTAL  NUMBER OF ABORTED OR SELF INTERRUPTED ATTEMPTS PAST LIFETIME: NO
1. IN THE PAST MONTH, HAVE YOU WISHED YOU WERE DEAD OR WISHED YOU COULD GO TO SLEEP AND NOT WAKE UP?: NO
5. HAVE YOU STARTED TO WORK OUT OR WORKED OUT THE DETAILS OF HOW TO KILL YOURSELF? DO YOU INTEND TO CARRY OUT THIS PLAN?: NO
TOTAL  NUMBER OF INTERRUPTED ATTEMPTS PAST 3 MONTHS: NO
5. HAVE YOU STARTED TO WORK OUT OR WORKED OUT THE DETAILS OF HOW TO KILL YOURSELF? DO YOU INTEND TO CARRY OUT THIS PLAN?: NO
1. IN THE PAST MONTH, HAVE YOU WISHED YOU WERE DEAD OR WISHED YOU COULD GO TO SLEEP AND NOT WAKE UP?: NO
6. HAVE YOU EVER DONE ANYTHING, STARTED TO DO ANYTHING, OR PREPARED TO DO ANYTHING TO END YOUR LIFE?: NO
6. HAVE YOU EVER DONE ANYTHING, STARTED TO DO ANYTHING, OR PREPARED TO DO ANYTHING TO END YOUR LIFE?: NO
TOTAL  NUMBER OF INTERRUPTED ATTEMPTS LIFETIME: NO
ATTEMPT PAST THREE MONTHS: NO
TOTAL  NUMBER OF ABORTED OR SELF INTERRUPTED ATTEMPTS PAST 3 MONTHS: NO
ATTEMPT LIFETIME: NO
3. HAVE YOU BEEN THINKING ABOUT HOW YOU MIGHT KILL YOURSELF?: NO
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
## 2021-01-04 ENCOUNTER — VIRTUAL VISIT (OUTPATIENT)
Dept: PSYCHOLOGY | Facility: CLINIC | Age: 46
End: 2021-01-04
Payer: COMMERCIAL

## 2021-01-04 DIAGNOSIS — F43.23 ADJUSTMENT DISORDER WITH MIXED ANXIETY AND DEPRESSED MOOD: Primary | ICD-10-CM

## 2021-01-04 PROCEDURE — 90834 PSYTX W PT 45 MINUTES: CPT | Mod: 95 | Performed by: SOCIAL WORKER

## 2021-01-04 NOTE — PROGRESS NOTES
Progress Note    Patient Name: Samantha Meier  Date: 21         Service Type: Individual      Session Start Time: 2:01 PM  Session End Time: 2:48 PM     Session Length: 47 minutes    Session #: 1    Attendees: Client attended alone    Service Modality:  Video Visit:      Provider verified identity through the following two step process.  Patient provided:  Patient  and Patient address    Telemedicine Visit: The patient's condition can be safely assessed and treated via synchronous audio and visual telemedicine encounter.      Reason for Telemedicine Visit: Services only offered telehealth    Originating Site (Patient Location): Patient's home    Distant Site (Provider Location): Provider Remote Setting    Consent:  The patient/guardian has verbally consented to: the potential risks and benefits of telemedicine (video visit) versus in person care; bill my insurance or make self-payment for services provided; and responsibility for payment of non-covered services.     Mode of Communication:  Video Conference via [employer]    As the provider I attest to compliance with applicable laws and regulations related to telemedicine.     Treatment Plan Last Reviewed: created today (21)  PHQ-9 / SHAHZAD-7 :   PHQ 2020   PHQ-9 Total Score 16 11 11   Q9: Thoughts of better off dead/self-harm past 2 weeks Not at all Not at all Not at all     SHAHZAD-7 SCORE 2020   Total Score 11 (moderate anxiety) 12 (moderate anxiety) 5 (mild anxiety)   Total Score 11 12 5         DATA  Interactive Complexity: No  Crisis: No       Progress Since Last Session (Related to Symptoms / Goals / Homework):   Symptoms: No change as this is the first session with this provider    Homework: N/A, first session with this provider      Episode of Care Goals: No improvement - PREPARATION (Decided to change - considering how); Intervened by negotiating a change plan and  "determining options / strategies for behavior change, identifying triggers, exploring social supports, and working towards setting a date to begin behavior change     Current / Ongoing Stressors and Concerns:   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 Lost structure in her life due to COVID. Limited support network.     Treatment Objective(s) Addressed in This Session:    Patient will report feeling annoyed on a regular basis.   Patient will increase her frequency of and comfort with engaging in others.      Intervention:   Building Rapport, Treatment Planning, CBT: Patient reports that she has periods of times that she feels \"like she's freaking out,\" but it doesn't look like it to others. She reports feeling completely overwhelmed, she's deep breathing just to make it through listening to others at these times. She reports being cool enough to handle intense situations, but having two run of the mill tasks can be overwhelming. Patient reports feeling sad at not having connections with others as she would like. Patient reports that when she is annoyed the days that are not good. Patient is worried about destroying her marriage. Discussed journaling. Talked about her relaxation in traveling and ability to initiate conversations and relationships there, but not as well when at home.         ASSESSMENT: Current Emotional / Mental Status (status of significant symptoms):   Risk status (Self / Other harm or suicidal ideation)   Patient denies current fears or concerns for personal safety.   Patient denies current or recent suicidal ideation or behaviors.   Patient denies current or recent homicidal ideation or behaviors.   Patient denies current or recent self injurious behavior or ideation.   Patient denies other safety concerns.   Patient reports there has been no change in risk factors since their last session.     Patient reports there has been no change in protective factors since " their last session.     Recommended that patient call 911 or go to the local ED should there be a change in any of these risk factors.     Appearance:   Appropriate    Eye Contact:   Good    Psychomotor Behavior: Normal    Attitude:   Cooperative    Orientation:   All   Speech    Rate / Production: Normal/ Responsive    Volume:  Normal    Mood:    Calm   Affect:    Appropriate    Thought Content:  Clear    Thought Form:  Coherent  Logical    Insight:    Good      Medication Review:   No current psychiatric medications prescribed     Medication Compliance:   NA     Changes in Health Issues:   None reported     Chemical Use Review:   Substance Use: Chemical use reviewed, no active concerns identified      Tobacco Use: No current tobacco use.      Diagnosis:  1. Adjustment disorder with mixed anxiety and depressed mood        Collateral Reports Completed:   Routed note to PCP    PLAN: (Patient Tasks / Therapist Tasks / Other)  Track times you are feeling annoyed, journal about the other person's objective           BRITTNEY LORENZ                                                         ______________________________________________________________________    Treatment Plan    Patient's Name: Samantha Meier  YOB: 1975    Date: 1/4/21    DSM5 Diagnoses: Adjustment Disorders  309.28 (F43.23) With mixed anxiety and depressed mood  Psychosocial / Contextual Factors: 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 Lost structure in her life due to COVID. Limited support network.  WHODAS: 29    Referral / Collaboration:  Referral to another professional/service is not indicated at this time.    Anticipated number of session or this episode of care: 8      MeasurableTreatment Goal(s) related to diagnosis / functional impairment(s)  Goal 1: Patient will reduce overall levels of depression and anxiety as evidenced by reduction in PHQ-9 score (11 on 11/27/20) and SHAHZAD-7 score (12 on  "11/27/20)    I will know I've met my goal when I feel good throughout the day.\"    Objective #A (Patient Action)    Patient will report feeling annoyed on a regular basis.   Status: New - Date: 1/4/21     Intervention(s)  Therapist will help patient gain greater understanding on what contributes to her feelings of annoyance, work on coping skills, and identify and shifting any thought disortions through the use of CBT.    Objective #B  Patient will increase her frequency of and comfort with engaging in others.   Status: New - Date: 1/4/21     Intervention(s)  Therapist will help patient explore what is holding her back from engagement and work on utilizing coping skills and thought challenges as necessary.      Patient has not reviewed nor agreed to the above plan.      BRITTNEY LORENZ  January 4, 2021  "

## 2021-01-06 NOTE — PATIENT INSTRUCTIONS
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 Lost structure in her life due to COVID. Limited support network.  WHODAS: 29    Referral / Collaboration:  Referral to another professional/service is not indicated at this time.    Anticipated number of session or this episode of care: 8      MeasurableTreatment Goal(s) related to diagnosis / functional impairment(s)  Goal 1: Patient will reduce overall levels of depression and anxiety as evidenced by reduction in PHQ-9 score (11 on 11/27/20) and SHAHZAD-7 score (12 on 11/27/20)    I will know I've met my goal when I feel good throughout the day.\"    Objective #A (Patient Action)    Patient will report feeling annoyed on a regular basis.   Status: New - Date: 1/4/21     Intervention(s)  Therapist will help patient gain greater understanding on what contributes to her feelings of annoyance, work on coping skills, and identify and shifting any thought disortions through the use of CBT.    Objective #B  Patient will increase her frequency of and comfort with engaging in others.   Status: New - Date: 1/4/21     Intervention(s)  Therapist will help patient explore what is holding her back from engagement and work on utilizing coping skills and thought challenges as necessary.    "

## 2021-01-15 ENCOUNTER — HEALTH MAINTENANCE LETTER (OUTPATIENT)
Age: 46
End: 2021-01-15

## 2021-01-20 ENCOUNTER — VIRTUAL VISIT (OUTPATIENT)
Dept: PSYCHOLOGY | Facility: CLINIC | Age: 46
End: 2021-01-20
Payer: COMMERCIAL

## 2021-01-20 DIAGNOSIS — F43.23 ADJUSTMENT DISORDER WITH MIXED ANXIETY AND DEPRESSED MOOD: Primary | ICD-10-CM

## 2021-01-20 PROCEDURE — 90834 PSYTX W PT 45 MINUTES: CPT | Mod: 95 | Performed by: SOCIAL WORKER
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
## 2021-01-20 NOTE — PATIENT INSTRUCTIONS
If you are up for it, after further processing your loss, journal about what you are afraid of if you were to be vulnerable.

## 2021-02-03 ENCOUNTER — VIRTUAL VISIT (OUTPATIENT)
Dept: PSYCHOLOGY | Facility: CLINIC | Age: 46
End: 2021-02-03
Payer: COMMERCIAL

## 2021-02-03 DIAGNOSIS — F43.23 ADJUSTMENT DISORDER WITH MIXED ANXIETY AND DEPRESSED MOOD: Primary | ICD-10-CM

## 2021-02-03 PROCEDURE — 90834 PSYTX W PT 45 MINUTES: CPT | Mod: 95 | Performed by: SOCIAL WORKER

## 2021-02-03 ASSESSMENT — ANXIETY QUESTIONNAIRES
5. BEING SO RESTLESS THAT IT IS HARD TO SIT STILL: SEVERAL DAYS
4. TROUBLE RELAXING: SEVERAL DAYS
6. BECOMING EASILY ANNOYED OR IRRITABLE: SEVERAL DAYS
1. FEELING NERVOUS, ANXIOUS, OR ON EDGE: SEVERAL DAYS
7. FEELING AFRAID AS IF SOMETHING AWFUL MIGHT HAPPEN: SEVERAL DAYS
GAD7 TOTAL SCORE: 7
GAD7 TOTAL SCORE: 7
2. NOT BEING ABLE TO STOP OR CONTROL WORRYING: SEVERAL DAYS
3. WORRYING TOO MUCH ABOUT DIFFERENT THINGS: SEVERAL DAYS
GAD7 TOTAL SCORE: 7
7. FEELING AFRAID AS IF SOMETHING AWFUL MIGHT HAPPEN: SEVERAL DAYS

## 2021-02-03 ASSESSMENT — PATIENT HEALTH QUESTIONNAIRE - PHQ9
SUM OF ALL RESPONSES TO PHQ QUESTIONS 1-9: 7
SUM OF ALL RESPONSES TO PHQ QUESTIONS 1-9: 7
10. IF YOU CHECKED OFF ANY PROBLEMS, HOW DIFFICULT HAVE THESE PROBLEMS MADE IT FOR YOU TO DO YOUR WORK, TAKE CARE OF THINGS AT HOME, OR GET ALONG WITH OTHER PEOPLE: SOMEWHAT DIFFICULT

## 2021-02-03 NOTE — PROGRESS NOTES
Progress Note    Patient Name: Samantha Meier  Date: 2/3/21         Service Type: Individual      Session Start Time: 4:02 PM  Session End Time: 4:47 PM     Session Length: 45 minutes    Session #: 3    Attendees: Client attended alone    Service Modality:  Video Visit:      Provider verified identity through the following two step process.  Patient provided:  N/A, previous client    Telemedicine Visit: The patient's condition can be safely assessed and treated via synchronous audio and visual telemedicine encounter.      Reason for Telemedicine Visit: Services only offered telehealth    Originating Site (Patient Location): Patient's home    Distant Site (Provider Location): Provider Remote Setting    Consent:  The patient/guardian has verbally consented to: the potential risks and benefits of telemedicine (video visit) versus in person care; bill my insurance or make self-payment for services provided; and responsibility for payment of non-covered services.     Mode of Communication:  Video Conference via [employer]    As the provider I attest to compliance with applicable laws and regulations related to telemedicine.     Treatment Plan Last Reviewed: 1/4/21  PHQ-9 / SHAHZAD-7 :   PHQ 11/25/2020 1/18/2021 2/3/2021   PHQ-9 Total Score 16 13 7   Q9: Thoughts of better off dead/self-harm past 2 weeks Not at all Not at all Not at all   Some encounter information is confidential and restricted. Go to Review Flowsheets activity to see all data.     SHAHZAD-7 SCORE 1/1/2021 1/18/2021 2/3/2021   Total Score 5 (mild anxiety) 12 (moderate anxiety) 7 (mild anxiety)   Total Score - 12 7   Some encounter information is confidential and restricted. Go to Review Flowsheets activity to see all data.         DATA  Interactive Complexity: No  Crisis: No       Progress Since Last Session (Related to Symptoms / Goals / Homework):   Symptoms: Improving patient reports she's feeling better, but she doesn't  "know why     Homework: Achieved / completed to satisfaction   If you are up for it, after further processing your loss, journal about what you are afraid of if you were to be vulnerable. 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 Lost structure in her life due to COVID. Limited support network. One of her family members, the same age as her daughter, recently took her own life.    Current: Patient is feeling better, but doesn't know why and is uncertain if she should continue in therapy. Shared her fear around being vulnerable, \"I'm going to lose my relationships, I'm going to be a mess.\"      Treatment Objective(s) Addressed in This Session:    Patient will report feeling annoyed on a regular basis.   Patient will increase her frequency of and comfort with engaging in others.      Intervention:   CBT:      Explored patients thoughts on being vulnerable and engaged in socratic dialogue.   Explored relationship with her daughter and what she wants of this. Provided guidance on how to deepen other relationships.  Introduced how therapy could help with the shame and guilt from childhood.     ASSESSMENT: Current Emotional / Mental Status (status of significant symptoms):   Risk status (Self / Other harm or suicidal ideation)   Patient denies current fears or concerns for personal safety.   Patient denies current or recent suicidal ideation or behaviors.   Patient denies current or recent homicidal ideation or behaviors.   Patient denies current or recent self injurious behavior or ideation.   Patient denies other safety concerns.   Patient reports there has been no change in risk factors since their last session.     Patient reports there has been no change in protective " factors since their last session.     Recommended that patient call 911 or go to the local ED should there be a change in any of these risk factors.     Appearance:   Appropriate    Eye Contact:   Good    Psychomotor Behavior: Normal    Attitude:   Cooperative    Orientation:   All   Speech    Rate / Production: Normal/ Responsive    Volume:  Normal    Mood:    Anxious    Affect:    Appropriate    Thought Content:  Clear    Thought Form:  Coherent  Logical    Insight:    Good      Medication Review:   No current psychiatric medications prescribed     Medication Compliance:   NA     Changes in Health Issues:   None reported     Chemical Use Review:   Substance Use: Chemical use reviewed, no active concerns identified      Tobacco Use: No current tobacco use.      Diagnosis:  1. Adjustment disorder with mixed anxiety and depressed mood        Collateral Reports Completed:   Not Applicable    PLAN: (Patient Tasks / Therapist Tasks / Other)  Journal using feelings words on a regular basis.  Share more of yourself with your friends.    Next session possibly explore shame and guilt from your childhood.          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 Lost structure in her life due to COVID. Limited support network.  WHODAS: 29    Referral / Collaboration:  Referral to another professional/service is not indicated at this time.    Anticipated number of session or this episode of care: 8      MeasurableTreatment Goal(s) related to diagnosis / functional impairment(s)  Goal 1: Patient will reduce overall  "levels of depression and anxiety as evidenced by reduction in PHQ-9 score (11 on 11/27/20) and SHAHZAD-7 score (12 on 11/27/20)    I will know I've met my goal when I feel good throughout the day.\"    Objective #A (Patient Action)    Patient will report feeling annoyed on a regular basis.   Status: New - Date: 1/4/21     Intervention(s)  Therapist will help patient gain greater understanding on what contributes to her feelings of annoyance, work on coping skills, and identify and shifting any thought disortions through the use of CBT.    Objective #B  Patient will increase her frequency of and comfort with engaging in others.   Status: New - Date: 1/4/21     Intervention(s)  Therapist will help patient explore what is holding her back from engagement and work on utilizing coping skills and thought challenges as necessary.      Patient has reviewed and agreed to the above plan.      BRITTNEY LORENZ  January 4, 2021  "

## 2021-02-04 ASSESSMENT — PATIENT HEALTH QUESTIONNAIRE - PHQ9: SUM OF ALL RESPONSES TO PHQ QUESTIONS 1-9: 7

## 2021-02-04 ASSESSMENT — ANXIETY QUESTIONNAIRES: GAD7 TOTAL SCORE: 7

## 2021-05-16 ENCOUNTER — HEALTH MAINTENANCE LETTER (OUTPATIENT)
Age: 46
End: 2021-05-16

## 2021-09-05 ENCOUNTER — HEALTH MAINTENANCE LETTER (OUTPATIENT)
Age: 46
End: 2021-09-05

## 2021-10-29 ENCOUNTER — FCC EXTENDED DOCUMENTATION (OUTPATIENT)
Dept: PSYCHOLOGY | Facility: CLINIC | Age: 46
End: 2021-10-29

## 2021-10-29 NOTE — CONFIDENTIAL NOTE
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 Lost structure in her life due to COVID. Limited support network.  WHODAS 2.0 (12 item) Score: 29          Presenting Concern:  Patient had been feeling overwhelmed.      Reason for Discharge:  Client is satisfied with progress      Disposition at Time of Last Encounter:   Comments:   Patient had felt she had made progress and was uncertain about returning.     Risk Management:   Client denies a history of suicidal ideation, suicide attempts, self-injurious behavior, homicidal ideation, homicidal behavior and and other safety concerns  Recommended that patient call 911 or go to the local ED should there be a change in any of these risk factors.      Referred To:  Patient may return to [facility] at anytime.        BRITTNEY LORENZ   10/29/2021

## 2021-12-31 ASSESSMENT — ENCOUNTER SYMPTOMS
ABDOMINAL PAIN: 0
DYSURIA: 0
MYALGIAS: 0
HEARTBURN: 0
EYE PAIN: 0
WEAKNESS: 0
HEADACHES: 0
JOINT SWELLING: 0
HEMATOCHEZIA: 0
FEVER: 0
NERVOUS/ANXIOUS: 0
DIZZINESS: 0
CONSTIPATION: 0
SORE THROAT: 0
HEMATURIA: 0
ARTHRALGIAS: 0
CHILLS: 0
NAUSEA: 0
PALPITATIONS: 0
PARESTHESIAS: 0
FREQUENCY: 0
BREAST MASS: 0
SHORTNESS OF BREATH: 0
COUGH: 0
DIARRHEA: 0

## 2022-01-03 ENCOUNTER — OFFICE VISIT (OUTPATIENT)
Dept: FAMILY MEDICINE | Facility: CLINIC | Age: 47
End: 2022-01-03
Payer: COMMERCIAL

## 2022-01-03 VITALS
OXYGEN SATURATION: 99 % | TEMPERATURE: 98.4 F | DIASTOLIC BLOOD PRESSURE: 85 MMHG | SYSTOLIC BLOOD PRESSURE: 133 MMHG | BODY MASS INDEX: 20.98 KG/M2 | WEIGHT: 138 LBS | HEART RATE: 71 BPM

## 2022-01-03 DIAGNOSIS — F43.23 ADJUSTMENT DISORDER WITH MIXED ANXIETY AND DEPRESSED MOOD: ICD-10-CM

## 2022-01-03 DIAGNOSIS — Z13.220 ENCOUNTER FOR LIPID SCREENING FOR CARDIOVASCULAR DISEASE: ICD-10-CM

## 2022-01-03 DIAGNOSIS — Z80.41 FAMILY HISTORY OF MALIGNANT NEOPLASM OF OVARY: ICD-10-CM

## 2022-01-03 DIAGNOSIS — Z13.1 SCREENING FOR DIABETES MELLITUS: ICD-10-CM

## 2022-01-03 DIAGNOSIS — D22.9 MULTIPLE PIGMENTED NEVI: ICD-10-CM

## 2022-01-03 DIAGNOSIS — Z00.00 HEALTH CARE MAINTENANCE: ICD-10-CM

## 2022-01-03 DIAGNOSIS — Z13.0 SCREENING, ANEMIA, DEFICIENCY, IRON: ICD-10-CM

## 2022-01-03 DIAGNOSIS — Z97.5 IUD (INTRAUTERINE DEVICE) IN PLACE: ICD-10-CM

## 2022-01-03 DIAGNOSIS — R87.619 ABNORMAL CERVICAL PAPANICOLAOU SMEAR, UNSPECIFIED ABNORMAL PAP FINDING: ICD-10-CM

## 2022-01-03 DIAGNOSIS — Z13.6 ENCOUNTER FOR LIPID SCREENING FOR CARDIOVASCULAR DISEASE: ICD-10-CM

## 2022-01-03 DIAGNOSIS — Z23 NEED FOR PROPHYLACTIC VACCINATION AND INOCULATION AGAINST INFLUENZA: ICD-10-CM

## 2022-01-03 DIAGNOSIS — Z11.59 NEED FOR HEPATITIS C SCREENING TEST: ICD-10-CM

## 2022-01-03 DIAGNOSIS — Z00.00 ROUTINE HISTORY AND PHYSICAL EXAMINATION OF ADULT: Primary | ICD-10-CM

## 2022-01-03 DIAGNOSIS — Z71.89 ADVANCED DIRECTIVES, COUNSELING/DISCUSSION: ICD-10-CM

## 2022-01-03 LAB
BASOPHILS # BLD AUTO: 0 10E3/UL (ref 0–0.2)
BASOPHILS NFR BLD AUTO: 1 %
EOSINOPHIL # BLD AUTO: 0 10E3/UL (ref 0–0.7)
EOSINOPHIL NFR BLD AUTO: 1 %
ERYTHROCYTE [DISTWIDTH] IN BLOOD BY AUTOMATED COUNT: 12.1 % (ref 10–15)
HCT VFR BLD AUTO: 42.2 % (ref 35–47)
HGB BLD-MCNC: 14 G/DL (ref 11.7–15.7)
IMM GRANULOCYTES # BLD: 0 10E3/UL
IMM GRANULOCYTES NFR BLD: 0 %
LYMPHOCYTES # BLD AUTO: 2 10E3/UL (ref 0.8–5.3)
LYMPHOCYTES NFR BLD AUTO: 34 %
MCH RBC QN AUTO: 32.3 PG (ref 26.5–33)
MCHC RBC AUTO-ENTMCNC: 33.2 G/DL (ref 31.5–36.5)
MCV RBC AUTO: 97 FL (ref 78–100)
MONOCYTES # BLD AUTO: 0.5 10E3/UL (ref 0–1.3)
MONOCYTES NFR BLD AUTO: 8 %
NEUTROPHILS # BLD AUTO: 3.5 10E3/UL (ref 1.6–8.3)
NEUTROPHILS NFR BLD AUTO: 58 %
PLATELET # BLD AUTO: 257 10E3/UL (ref 150–450)
RBC # BLD AUTO: 4.34 10E6/UL (ref 3.8–5.2)
WBC # BLD AUTO: 6 10E3/UL (ref 4–11)

## 2022-01-03 PROCEDURE — 80061 LIPID PANEL: CPT | Performed by: FAMILY MEDICINE

## 2022-01-03 PROCEDURE — 86803 HEPATITIS C AB TEST: CPT | Performed by: FAMILY MEDICINE

## 2022-01-03 PROCEDURE — 99396 PREV VISIT EST AGE 40-64: CPT | Mod: 25 | Performed by: FAMILY MEDICINE

## 2022-01-03 PROCEDURE — 90686 IIV4 VACC NO PRSV 0.5 ML IM: CPT | Performed by: FAMILY MEDICINE

## 2022-01-03 PROCEDURE — 80050 GENERAL HEALTH PANEL: CPT | Performed by: FAMILY MEDICINE

## 2022-01-03 PROCEDURE — 90471 IMMUNIZATION ADMIN: CPT | Performed by: FAMILY MEDICINE

## 2022-01-03 PROCEDURE — 36415 COLL VENOUS BLD VENIPUNCTURE: CPT | Performed by: FAMILY MEDICINE

## 2022-01-03 ASSESSMENT — ANXIETY QUESTIONNAIRES
1. FEELING NERVOUS, ANXIOUS, OR ON EDGE: SEVERAL DAYS
2. NOT BEING ABLE TO STOP OR CONTROL WORRYING: SEVERAL DAYS
7. FEELING AFRAID AS IF SOMETHING AWFUL MIGHT HAPPEN: SEVERAL DAYS
3. WORRYING TOO MUCH ABOUT DIFFERENT THINGS: SEVERAL DAYS
GAD7 TOTAL SCORE: 7
6. BECOMING EASILY ANNOYED OR IRRITABLE: SEVERAL DAYS
7. FEELING AFRAID AS IF SOMETHING AWFUL MIGHT HAPPEN: SEVERAL DAYS
5. BEING SO RESTLESS THAT IT IS HARD TO SIT STILL: SEVERAL DAYS
GAD7 TOTAL SCORE: 7
4. TROUBLE RELAXING: SEVERAL DAYS
GAD7 TOTAL SCORE: 7

## 2022-01-03 ASSESSMENT — ENCOUNTER SYMPTOMS
DIARRHEA: 0
HEMATOCHEZIA: 0
WEAKNESS: 0
SORE THROAT: 0
FREQUENCY: 0
CONSTIPATION: 0
PARESTHESIAS: 0
HEARTBURN: 0
JOINT SWELLING: 0
NAUSEA: 0
COUGH: 0
MYALGIAS: 0
FEVER: 0
CHILLS: 0
NERVOUS/ANXIOUS: 0
DIZZINESS: 0
SHORTNESS OF BREATH: 0
PALPITATIONS: 0
HEADACHES: 0
EYE PAIN: 0
DYSURIA: 0
HEMATURIA: 0
ARTHRALGIAS: 0
BREAST MASS: 0
ABDOMINAL PAIN: 0

## 2022-01-03 ASSESSMENT — PATIENT HEALTH QUESTIONNAIRE - PHQ9
SUM OF ALL RESPONSES TO PHQ QUESTIONS 1-9: 6
10. IF YOU CHECKED OFF ANY PROBLEMS, HOW DIFFICULT HAVE THESE PROBLEMS MADE IT FOR YOU TO DO YOUR WORK, TAKE CARE OF THINGS AT HOME, OR GET ALONG WITH OTHER PEOPLE: SOMEWHAT DIFFICULT
SUM OF ALL RESPONSES TO PHQ QUESTIONS 1-9: 6

## 2022-01-03 NOTE — PATIENT INSTRUCTIONS
Physical done today, discussed preventive care and additional concerns  Self breast checks regularly  Consider a  referral if any family hx of breast, ovarian or bowel cancer.clarify diagnosis with mom  Mammogram  Pelvic PAP HPV due in 2023  Mirena iud in place since 1/2019  Health care maintenance reviewed  Consider working on health care directives  Recommend the flu shot yearly  Recommend the covid booster if not up to date  Labs today and will make further recommendations if any when reviewed  Self cares for mood/ anxiety/etc  Monitor moles on body , a lot look benign freckles, sebhoreic keratosis  Bumps on left forehead temple area long time, monitor, if changes rapidly or remain concerned we can refer to dermatology   Return in 1 yr for a preventive physical and sooner in an office visit for any new concerns    Preventive Health Recommendations  Female Ages 40 to 49    Yearly exam:     See your health care provider every year in order to  1. Review health changes.   2. Discuss preventive care.    3. Review your medicines if your doctor prescribed any.      Get a Pap test every three years (unless you have an abnormal result and your provider advises testing more often).      If you get Pap tests with HPV test, you only need to test every 5 years, unless you have an abnormal result. You do not need a Pap test if your uterus was removed (hysterectomy) and you have not had cancer.      You should be tested each year for STDs (sexually transmitted diseases), if you're at risk.     Ask your doctor if you should have a mammogram.      Have a colonoscopy (test for colon cancer) if someone in your family has had colon cancer or polyps before age 50.       Have a cholesterol test every 5 years.       Have a diabetes test (fasting glucose) after age 45. If you are at risk for diabetes, you should have this test every 3 years.    Shots: Get a flu shot each year. Get a tetanus shot every 10 years.     Nutrition:      Eat at least 5 servings of fruits and vegetables each day.    Eat whole-grain bread, whole-wheat pasta and brown rice instead of white grains and rice.    Get adequate Calcium and Vitamin D.      Lifestyle    Exercise at least 150 minutes a week (an average of 30 minutes a day, 5 days a week). This will help you control your weight and prevent disease.    Limit alcohol to one drink per day.    No smoking.     Wear sunscreen to prevent skin cancer.    See your dentist every six months for an exam and cleaning.

## 2022-01-03 NOTE — RESULT ENCOUNTER NOTE
Monserrat YunPatricia Meier,  Some of your results came back and are within acceptable limits. -Normal red blood cell (hgb) levels, normal white blood cell count and normal platelet levels.. If you have any further concerns please do not hesitate to contact us by message, phone or making an appointment.  Have a good day   Dr Marquez HERNÁNDEZ

## 2022-01-03 NOTE — PROGRESS NOTES
SUBJECTIVE:   CC: Samantha Meier is an 46 year old woman who presents for preventive health visit.     Patient has been advised of split billing requirements and indicates understanding: Yes  Healthy Habits:     Getting at least 3 servings of Calcium per day:  Yes    Bi-annual eye exam:  Yes    Dental care twice a year:  Yes    Sleep apnea or symptoms of sleep apnea:  None    Diet:  Regular (no restrictions)    Frequency of exercise:  2-3 days/week    Duration of exercise:  45-60 minutes    Taking medications regularly:  Yes    Medication side effects:  None    PHQ-2 Total Score: 2    Additional concerns today:  No  Answers for HPI/ROS submitted by the patient on 1/3/2022  If you checked off any problems, how difficult have these problems made it for you to do your work, take care of things at home, or get along with other people?: Somewhat difficult  PHQ9 TOTAL SCORE: 6  SHAHZAD 7 TOTAL SCORE: 7    BACKGROUND  Hx of abnormal PAP in 1996, s/p cryo, normal since, Mirena IUD in place since 2019, prior cat scratch fever s/ LN biopsy in 2001, hx of a compound nevus, under care of PCP Dr Roach  Seen by PCP 12/2018 for a physical, referred to get an IUD, LDL was elevated, and diagnostic breast imaging ordered for concern of breast lump  Diagnostic mammogram and breast u/s done 1/7/19 which was normal  Mirena placed by gyn 1/8/2019  E visit done Nov 2020 for referral to a Counsellor  Did counseling with Eveline 1/4/21 to 10/20/21 for adjustment disorder with mixed anxiety and depression    Here for a preventive physical. New to this provider   no breast issues  Clarified fh of mom with cervical not ovarian cancer in which had a full hysterectomy including ovaries removed, will double check and get back to us  Mammogram due  Pap due in 2023.  Remote abnormal Pap in 1996 s/p cryo normal since then.  Mirena IUD in place since 1/2019 and doing well on it.  LMP unknown as has some premenstrual breast symptoms at times and some  spotting but nothing on a regular basis.  No advance directives on file. Discussed honoring choices    Mood/ anxiety stable, no longer in counseling, managing without any medications, is a , recently spend the holidays alone with dog as her  and daughter went to visit her stepson and it was very relaxing.  Self cares include exercising & meditation.     Reports COVID postop to date as of November had the alvaro  Agreeable to flu shot today.    Today's PHQ-2 Score:   PHQ-2 ( 1999 Pfizer) 12/31/2021   Q1: Little interest or pleasure in doing things 1   Q2: Feeling down, depressed or hopeless 1   PHQ-2 Score 2   PHQ-2 Total Score (12-17 Years)- Positive if 3 or more points; Administer PHQ-A if positive -   Q1: Little interest or pleasure in doing things Several days   Q2: Feeling down, depressed or hopeless Several days   PHQ-2 Score 2       Abuse: Current or Past (Physical, Sexual or Emotional) - No  Do you feel safe in your environment? Yes        Social History     Tobacco Use     Smoking status: Never Smoker     Smokeless tobacco: Never Used   Substance Use Topics     Alcohol use: Yes     Alcohol/week: 1.0 - 3.0 standard drink     Comment: socially     If you drink alcohol do you typically have >3 drinks per day or >7 drinks per week? No    Alcohol Use 1/3/2022   Prescreen: >3 drinks/day or >7 drinks/week? -   Prescreen: >3 drinks/day or >7 drinks/week? No     Reviewed orders with patient.  Reviewed health maintenance and updated orders accordingly - Yes  Lab work is in process  Labs reviewed in EPIC  BP Readings from Last 3 Encounters:   01/03/22 133/85   01/08/19 136/87   12/20/18 132/88    Wt Readings from Last 3 Encounters:   01/03/22 62.6 kg (138 lb)   01/08/19 61.2 kg (135 lb)   12/20/18 60.7 kg (133 lb 12 oz)                  Patient Active Problem List   Diagnosis     Abnormal Pap smear of cervix s/p cryotherapy     IUD (intrauterine device) in place     Past Surgical History:    Procedure Laterality Date     LYMPH NODE BIOPSY  2001    cat scratch disease       Social History     Tobacco Use     Smoking status: Never Smoker     Smokeless tobacco: Never Used   Substance Use Topics     Alcohol use: Yes     Alcohol/week: 1.0 - 3.0 standard drink     Comment: socially     Family History   Problem Relation Age of Onset     Cancer Mother 27        ? cervical , full hysterectomy adn ovaries removed     Depression Mother      Coronary Artery Disease Father 60     Diabetes Father      Depression Sister      Coronary Artery Disease Maternal Grandmother 60        heart attacks     Coronary Artery Disease Paternal Grandmother         4V CAB - twice     Heart Disease Paternal Grandfather         concentration camp survivor     Asthma Daughter          Current Outpatient Medications   Medication Sig Dispense Refill     levonorgestrel (MIRENA) 20 MCG/24HR IUD 1 each (20 mcg) by Intrauterine route once for 1 dose       No Known Allergies  Recent Labs   Lab Test 01/02/19  0935   LDL 90      TRIG 56        Breast Cancer Screening:    Breast CA Risk Assessment (FHS-7) 12/31/2021   Do you have a family history of breast, colon, or ovarian cancer? No / Unknown       Mammogram Screening - Offered annual screening and updated Health Maintenance for mutual plan based on risk factor consideration    Pertinent mammograms are reviewed under the imaging tab.    History of abnormal Pap smear:   NO - age 30-65 PAP every 5 years with negative HPV co-testing recommended  Last 3 Pap and HPV Results:   PAP / HPV Latest Ref Rng & Units 12/20/2018 7/18/2013   PAP (Historical) - NIL NIL   HPV16 NEG:Negative Negative -   HPV18 NEG:Negative Negative -   HRHPV NEG:Negative Negative -     PAP / HPV Latest Ref Rng & Units 12/20/2018 7/18/2013   PAP (Historical) - NIL NIL   HPV16 NEG:Negative Negative -   HPV18 NEG:Negative Negative -   HRHPV NEG:Negative Negative -     Reviewed and updated as needed this visit by clinical  staff  Tobacco  Allergies  Meds  Problems  Med Hx  Surg Hx  Fam Hx  Soc Hx         Reviewed and updated as needed this visit by Provider  Tobacco  Allergies  Meds  Problems  Med Hx  Surg Hx  Fam Hx  Soc Hx        Past Medical History:   Diagnosis Date     Abnormal Pap smear of cervix     Minor dysplasia s/p cryotherapy     Cat scratch fever     by LN biopsy      Past Surgical History:   Procedure Laterality Date     LYMPH NODE BIOPSY      cat scratch disease     OB History    Para Term  AB Living   2 1 1 0 1 1   SAB IAB Ectopic Multiple Live Births   1 0 0 0 0      # Outcome Date GA Lbr Kg/2nd Weight Sex Delivery Anes PTL Lv   2 SAB               Birth Comments: first trimester miscarriage   1 Term                Review of Systems   Constitutional: Negative for chills and fever.   HENT: Negative for congestion, ear pain, hearing loss and sore throat.    Eyes: Negative for pain and visual disturbance.   Respiratory: Negative for cough and shortness of breath.    Cardiovascular: Negative for chest pain, palpitations and peripheral edema.   Gastrointestinal: Negative for abdominal pain, constipation, diarrhea, heartburn, hematochezia and nausea.   Breasts:  Negative for tenderness, breast mass and discharge.   Genitourinary: Negative for dysuria, frequency, genital sores, hematuria, pelvic pain, urgency, vaginal bleeding and vaginal discharge.   Musculoskeletal: Negative for arthralgias, joint swelling and myalgias.   Skin: Negative for rash.   Neurological: Negative for dizziness, weakness, headaches and paresthesias.   Psychiatric/Behavioral: Negative for mood changes. The patient is not nervous/anxious.       OBJECTIVE:   /85 (BP Location: Right arm, Patient Position: Chair, Cuff Size: Adult Regular)   Pulse 71   Temp 98.4  F (36.9  C) (Temporal)   Wt 62.6 kg (138 lb)   LMP  (LMP Unknown)   SpO2 99%   Breastfeeding No   BMI 20.98 kg/m    Physical Exam  GENERAL:  healthy, alert and no distress  EYES: Eyes grossly normal to inspection, PERRL and conjunctivae and sclerae normal  HENT: normal cephalic/atraumatic, ear canals and TM's normal, nose and mouth without ulcers or lesions, oropharynx clear, oral mucous membranes moist and bilateral large ear piercing  NECK: no adenopathy, no asymmetry, masses, or scars and thyroid normal to palpation  RESP: lungs clear to auscultation - no rales, rhonchi or wheezes  CV: regular rate and rhythm, normal S1 S2, no S3 or S4, no murmur, click or rub, no peripheral edema and peripheral pulses strong  ABDOMEN: soft, non tender, no hepatosplenomegaly, no masses and bowel sounds normal  MS: no gross musculoskeletal defects noted, no edema  SKIN: Multiple freckles, pigmented nevi, lentigines, seborrheic keratosis right mid abdomen, tiny papules left temple area ongoing for 5 years never goes away.  Multiple tattoos all over body,  NEURO: Normal strength and tone, mentation intact and speech normal  PSYCH: mentation appears normal, affect normal/bright    Diagnostic Test Results:  Labs reviewed in Epic  Results for orders placed or performed in visit on 01/03/22 (from the past 24 hour(s))   CBC with platelets and differential    Narrative    The following orders were created for panel order CBC with platelets and differential.  Procedure                               Abnormality         Status                     ---------                               -----------         ------                     CBC with platelets and d...[[id number]]                      Final result                 Please view results for these tests on the individual orders.   CBC with platelets and differential   Result Value Ref Range    WBC Count 6.0 4.0 - 11.0 10e3/uL    RBC Count 4.34 3.80 - 5.20 10e6/uL    Hemoglobin 14.0 11.7 - 15.7 g/dL    Hematocrit 42.2 35.0 - 47.0 %    MCV 97 78 - 100 fL    MCH 32.3 26.5 - 33.0 pg    MCHC 33.2 31.5 - 36.5 g/dL    RDW 12.1 10.0 -  15.0 %    Platelet Count 257 150 - 450 10e3/uL    % Neutrophils 58 %    % Lymphocytes 34 %    % Monocytes 8 %    % Eosinophils 1 %    % Basophils 1 %    % Immature Granulocytes 0 %    Absolute Neutrophils 3.5 1.6 - 8.3 10e3/uL    Absolute Lymphocytes 2.0 0.8 - 5.3 10e3/uL    Absolute Monocytes 0.5 0.0 - 1.3 10e3/uL    Absolute Eosinophils 0.0 0.0 - 0.7 10e3/uL    Absolute Basophils 0.0 0.0 - 0.2 10e3/uL    Absolute Immature Granulocytes 0.0 <=0.4 10e3/uL       ASSESSMENT/PLAN:       ICD-10-CM    1. Routine history and physical examination of adult  Z00.00 MA Screen Bilateral w/Chirag     CBC with platelets and differential     Comprehensive metabolic panel (BMP + Alb, Alk Phos, ALT, AST, Total. Bili, TP)     Lipid Profile (Chol, Trig, HDL, LDL calc)     INFLUENZA VACCINE IM > 6 MONTHS VALENT IIV4 (AFLURIA/FLUZONE)     CBC with platelets and differential     Comprehensive metabolic panel (BMP + Alb, Alk Phos, ALT, AST, Total. Bili, TP)     Lipid Profile (Chol, Trig, HDL, LDL calc)   2. IUD (intrauterine device) in place  Z97.5    3. Family history of malignant neoplasm of ovary  Z80.41    4. Abnormal cervical Papanicolaou smear, unspecified abnormal pap finding  R87.619    5. Adjustment disorder with mixed anxiety and depressed mood  F43.23 TSH with free T4 reflex     TSH with free T4 reflex   6. Multiple pigmented nevi  D22.9    7. Health care maintenance  Z00.00 REVIEW OF HEALTH MAINTENANCE PROTOCOL ORDERS   8. Advanced directives, counseling/discussion  Z71.89    9. Need for hepatitis C screening test  Z11.59 Hepatitis C Screen Reflex to HCV RNA Quant and Genotype     Hepatitis C Screen Reflex to HCV RNA Quant and Genotype   10. Screening, anemia, deficiency, iron  Z13.0 CBC with platelets and differential     CBC with platelets and differential   11. Screening for diabetes mellitus  Z13.1 Comprehensive metabolic panel (BMP + Alb, Alk Phos, ALT, AST, Total. Bili, TP)     Comprehensive metabolic panel (BMP + Alb, Alk  Phos, ALT, AST, Total. Bili, TP)   12. Encounter for lipid screening for cardiovascular disease  Z13.220 Lipid Profile (Chol, Trig, HDL, LDL calc)    Z13.6 Lipid Profile (Chol, Trig, HDL, LDL calc)   13. Need for prophylactic vaccination and inoculation against influenza  Z23 INFLUENZA VACCINE IM > 6 MONTHS VALENT IIV4 (AFLURIA/FLUZONE)     Physical done today, discussed preventive care and additional concerns  Self breast checks regularly  Consider a  referral if any family hx of breast, ovarian or bowel cancer. To clarify diagnosis with mom.  Reports mother had cervical and not ovarian cancer as previously documented.  Mammogram due & order placed  Pelvic PAP HPV due in 2023  Mirena IUD in place since 1/2019, & working well for her  Health care maintenance reviewed  Consider working on health care directives  Recommend the flu shot yearly and given today  Recommend the Covid booster if not up to date.  Reports had the COVID booster moderna  in November. No records available.   Labs today and will make further recommendations if any when reviewed  Self cares for mood/ anxiety/etc. Currently functioning well.  Monitor moles on body , a lot look benign freckles, sebhoreic keratosis  Bumps on left forehead temple area long time, monitor, if changes rapidly or remains concerned we can refer to dermatology for a biopsy.  Return in 1 yr for a preventive physical and sooner in an office visit for any new concerns    Patient has been advised of split billing requirements and indicates understanding: Yes  COUNSELING:  Reviewed preventive health counseling, as reflected in patient instructions       Regular exercise       Healthy diet/nutrition       Vision screening       Immunizations    Vaccinated for: Influenza         Alcohol Use       Contraception       Osteoporosis prevention/bone health       Consider Hep C screening for all patients one time for ages 18-79 years       The ASCVD Risk score ([place] AMY Jr., et  "al., 2013) failed to calculate for the following reasons:    The valid HDL cholesterol range is 20 to 100 mg/dL       Advance Care Planning    Estimated body mass index is 20.98 kg/m  as calculated from the following:    Height as of 12/20/18: 1.727 m (5' 8\").    Weight as of this encounter: 62.6 kg (138 lb).    Weight management plan noted, stable and monitoring    She reports that she has never smoked. She has never used smokeless tobacco.      Counseling Resources:  ATP IV Guidelines  Pooled Cohorts Equation Calculator  Breast Cancer Risk Calculator  BRCA-Related Cancer Risk Assessment: FHS-7 Tool  FRAX Risk Assessment  ICSI Preventive Guidelines  Dietary Guidelines for Americans, 2010  USDA's MyPlate  ASA Prophylaxis  Lung CA Screening    Luly Zayas MD  [facility]  "

## 2022-01-04 LAB
ALBUMIN SERPL-MCNC: 4.2 G/DL (ref 3.4–5)
ALP SERPL-CCNC: 57 U/L (ref 40–150)
ALT SERPL W P-5'-P-CCNC: 17 U/L (ref 0–50)
ANION GAP SERPL CALCULATED.3IONS-SCNC: 8 MMOL/L (ref 3–14)
AST SERPL W P-5'-P-CCNC: 15 U/L (ref 0–45)
BILIRUB SERPL-MCNC: 0.6 MG/DL (ref 0.2–1.3)
BUN SERPL-MCNC: 15 MG/DL (ref 7–30)
CALCIUM SERPL-MCNC: 9 MG/DL (ref 8.5–10.1)
CHLORIDE BLD-SCNC: 104 MMOL/L (ref 94–109)
CHOLEST SERPL-MCNC: 203 MG/DL
CO2 SERPL-SCNC: 24 MMOL/L (ref 20–32)
CREAT SERPL-MCNC: 0.7 MG/DL (ref 0.52–1.04)
FASTING STATUS PATIENT QL REPORTED: NO
GFR SERPL CREATININE-BSD FRML MDRD: >90 ML/MIN/1.73M2
GLUCOSE BLD-MCNC: 84 MG/DL (ref 70–99)
HCV AB SERPL QL IA: NONREACTIVE
HDLC SERPL-MCNC: 105 MG/DL
LDLC SERPL CALC-MCNC: 86 MG/DL
NONHDLC SERPL-MCNC: 98 MG/DL
POTASSIUM BLD-SCNC: 4.1 MMOL/L (ref 3.4–5.3)
PROT SERPL-MCNC: 7.7 G/DL (ref 6.8–8.8)
SODIUM SERPL-SCNC: 136 MMOL/L (ref 133–144)
TRIGL SERPL-MCNC: 61 MG/DL
TSH SERPL DL<=0.005 MIU/L-ACNC: 1.2 MU/L (ref 0.4–4)

## 2022-01-04 ASSESSMENT — ANXIETY QUESTIONNAIRES: GAD7 TOTAL SCORE: 7

## 2022-01-04 ASSESSMENT — PATIENT HEALTH QUESTIONNAIRE - PHQ9: SUM OF ALL RESPONSES TO PHQ QUESTIONS 1-9: 6

## 2022-01-04 NOTE — RESULT ENCOUNTER NOTE
Monserrat Ms. Meier,  Your results came back and are within acceptable limits. -Cholesterol levels (LDL,HDL, Triglycerides) are normal.  ADVISE: rechecking in 1 year. . If you have any further concerns please do not hesitate to contact us by message, phone or making an appointment.  Have a good day   Dr Marquez HERNÁNDEZ

## 2022-01-04 NOTE — RESULT ENCOUNTER NOTE
Monserrat Ms. Meier,  Your results came back and are within acceptable limits. -TSH (thyroid stimulating hormone) level is normal which indicates normal thyroid function.. If you have any further concerns please do not hesitate to contact us by message, phone or making an appointment.  Have a good day   Dr Marquez HERNÁNDEZ

## 2022-01-04 NOTE — RESULT ENCOUNTER NOTE
Monserrat Ms. Meier,  Your results came back and are within acceptable limits. -Liver and gallbladder tests are normal (ALT,AST, Alk phos, bilirubin), kidney function is normal (Cr, GFR), sodium is normal, potassium is normal, calcium is normal, glucose is normal.. If you have any further concerns please do not hesitate to contact us by message, phone or making an appointment.  Have a good day   Dr Marquez HERNÁNDEZ

## 2022-01-04 NOTE — RESULT ENCOUNTER NOTE
Monserrat Ms. Meier,  Your results came back and are within acceptable limits. -Hepatitis C antibody screen test shows no signs of a previous hepatitis C infection.. If you have any further concerns please do not hesitate to contact us by message, phone or making an appointment.  Have a good day   Dr Marquez HERNÁNDEZ

## 2022-01-24 ENCOUNTER — ANCILLARY PROCEDURE (OUTPATIENT)
Dept: MAMMOGRAPHY | Facility: CLINIC | Age: 47
End: 2022-01-24
Attending: FAMILY MEDICINE
Payer: COMMERCIAL

## 2022-01-24 DIAGNOSIS — Z00.00 ROUTINE HISTORY AND PHYSICAL EXAMINATION OF ADULT: ICD-10-CM

## 2022-01-24 PROCEDURE — 77067 SCR MAMMO BI INCL CAD: CPT | Mod: 26 | Performed by: RADIOLOGY

## 2022-01-24 PROCEDURE — 77067 SCR MAMMO BI INCL CAD: CPT

## 2022-07-09 ENCOUNTER — MYC MEDICAL ADVICE (OUTPATIENT)
Dept: FAMILY MEDICINE | Facility: CLINIC | Age: 47
End: 2022-07-09

## 2022-07-14 ENCOUNTER — OFFICE VISIT (OUTPATIENT)
Dept: FAMILY MEDICINE | Facility: CLINIC | Age: 47
End: 2022-07-14
Payer: COMMERCIAL

## 2022-07-14 VITALS
BODY MASS INDEX: 20.53 KG/M2 | SYSTOLIC BLOOD PRESSURE: 132 MMHG | HEART RATE: 68 BPM | DIASTOLIC BLOOD PRESSURE: 70 MMHG | TEMPERATURE: 97.9 F | WEIGHT: 135 LBS | RESPIRATION RATE: 16 BRPM | OXYGEN SATURATION: 99 %

## 2022-07-14 DIAGNOSIS — Z12.11 SCREEN FOR COLON CANCER: ICD-10-CM

## 2022-07-14 DIAGNOSIS — N63.20 LEFT BREAST LUMP: ICD-10-CM

## 2022-07-14 PROCEDURE — 99214 OFFICE O/P EST MOD 30 MIN: CPT | Performed by: FAMILY MEDICINE

## 2022-07-14 NOTE — PROGRESS NOTES
Assessment & Plan     1. Left breast lump  - Reviewed mammogram 1/22  - New left breast lump, ordered below imaging for further evaluation  - Due to cost, pt will call insurance company about imaging. If orders needs to be faxed, pt will call with fax information.   - MA Diagnostic Digital Left; Future  - US Breast Left Limited 1-3 Quadrants; Future    2. Screen for colon cancer  - COLOGUARD(EXACT SCIENCES)      Rajiv Maciel MD  [facility]    Prem Singh is a 47 year old, presenting for the following health issues:  No chief complaint on file.      History of Present Illness       Reason for visit:  Lump in breast  Symptom onset:  3-7 days ago  Symptoms include:  Lump in left breast  Symptom intensity:  Moderate  Symptom progression:  Staying the same  Had these symptoms before:  No    She eats 2-3 servings of fruits and vegetables daily.She consumes 0 sweetened beverage(s) daily.She exercises with enough effort to increase her heart rate 30 to 60 minutes per day.  She exercises with enough effort to increase her heart rate 5 days per week.   She is taking medications regularly.       One week ago noticed left breast lump. It hasn't changed in size.  She just had her menstrual cycle. Before her menstrual cycle, she had sight pain in the breasts.  No nipple discharge or skin changes.   No history of breast cancer.  Normal mammogram 1/22    Review of Systems         Objective    There were no vitals taken for this visit.  There is no height or weight on file to calculate BMI.  Physical Exam   BP (!) 137/91 (BP Location: Left arm, Patient Position: Chair, Cuff Size: Adult Regular)   Pulse 68   Temp 97.9  F (36.6  C) (Temporal)   Resp 16   Wt 61.2 kg (135 lb)   LMP 07/11/2022 (Approximate)   SpO2 99%   BMI 20.53 kg/m     /70 (BP Location: Right arm, Patient Position: Sitting, Cuff Size: Adult Regular)   Pulse 68   Temp 97.9  F (36.6  C) (Temporal)   Resp 16    Wt 61.2 kg (135 lb)   LMP 07/11/2022 (Approximate)   SpO2 99%   BMI 20.53 kg/m    GENERAL: healthy, alert and no distress  BREAST: left breast with outer lump, no tenderness or nipple discharge and no palpable axillary masses or adenopathy                      .  ..

## 2022-07-21 ENCOUNTER — ANCILLARY PROCEDURE (OUTPATIENT)
Dept: MAMMOGRAPHY | Facility: CLINIC | Age: 47
End: 2022-07-21
Attending: FAMILY MEDICINE
Payer: COMMERCIAL

## 2022-07-21 DIAGNOSIS — N63.20 LEFT BREAST LUMP: ICD-10-CM

## 2022-07-21 PROCEDURE — G0279 TOMOSYNTHESIS, MAMMO: HCPCS | Performed by: STUDENT IN AN ORGANIZED HEALTH CARE EDUCATION/TRAINING PROGRAM

## 2022-07-21 PROCEDURE — 77065 DX MAMMO INCL CAD UNI: CPT | Mod: LT | Performed by: STUDENT IN AN ORGANIZED HEALTH CARE EDUCATION/TRAINING PROGRAM

## 2022-08-04 LAB — NONINV COLON CA DNA+OCC BLD SCRN STL QL: NEGATIVE

## 2022-10-23 ENCOUNTER — HEALTH MAINTENANCE LETTER (OUTPATIENT)
Age: 47
End: 2022-10-23

## 2022-12-22 ENCOUNTER — MYC MEDICAL ADVICE (OUTPATIENT)
Dept: FAMILY MEDICINE | Facility: CLINIC | Age: 47
End: 2022-12-22

## 2022-12-22 DIAGNOSIS — Z13.39 ADHD (ATTENTION DEFICIT HYPERACTIVITY DISORDER) EVALUATION: Primary | ICD-10-CM

## 2022-12-22 DIAGNOSIS — F43.23 ADJUSTMENT DISORDER WITH MIXED ANXIETY AND DEPRESSED MOOD: ICD-10-CM

## 2022-12-22 NOTE — TELEPHONE ENCOUNTER
"Dr. Roach-Please review patient's message and advise:  1. Do you know if patient can have assessment for ADHD and Bipolar disorder within one visit?   A. Referral pended to the best of writer's ability    \"Hello,  I have been seeing a therapist and she is recommending that I be evaluated for bipolar disorder. She told me to get a referral through my primary care Dr. Can you help me with this?  I personally would like to be evaluated for ADHD.   Is this possible to do at the same time?   Thank you!\"    Thank you!  MACKENZIE DietrichN, RN-BC  [facility]    "

## 2022-12-22 NOTE — TELEPHONE ENCOUNTER
Writer responded via [employer].    MACKENZIE DietrichN, RN-BC  [facility]

## 2023-04-02 ENCOUNTER — HEALTH MAINTENANCE LETTER (OUTPATIENT)
Age: 48
End: 2023-04-02

## 2023-05-01 ENCOUNTER — OFFICE VISIT (OUTPATIENT)
Dept: FAMILY MEDICINE | Facility: CLINIC | Age: 48
End: 2023-05-01
Payer: COMMERCIAL

## 2023-05-01 VITALS
WEIGHT: 145 LBS | RESPIRATION RATE: 16 BRPM | BODY MASS INDEX: 21.98 KG/M2 | OXYGEN SATURATION: 100 % | HEART RATE: 68 BPM | TEMPERATURE: 97.9 F | HEIGHT: 68 IN | DIASTOLIC BLOOD PRESSURE: 80 MMHG | SYSTOLIC BLOOD PRESSURE: 112 MMHG

## 2023-05-01 DIAGNOSIS — Z01.84 IMMUNITY STATUS TESTING: ICD-10-CM

## 2023-05-01 DIAGNOSIS — Z00.00 ROUTINE GENERAL MEDICAL EXAMINATION AT A HEALTH CARE FACILITY: Primary | ICD-10-CM

## 2023-05-01 DIAGNOSIS — Z12.31 ENCOUNTER FOR SCREENING MAMMOGRAM FOR MALIGNANT NEOPLASM OF BREAST: ICD-10-CM

## 2023-05-01 DIAGNOSIS — L02.93 CARBUNCLE: ICD-10-CM

## 2023-05-01 LAB
ALBUMIN SERPL BCG-MCNC: 4.8 G/DL (ref 3.5–5.2)
ALP SERPL-CCNC: 52 U/L (ref 35–104)
ALT SERPL W P-5'-P-CCNC: 11 U/L (ref 10–35)
ANION GAP SERPL CALCULATED.3IONS-SCNC: 12 MMOL/L (ref 7–15)
AST SERPL W P-5'-P-CCNC: 18 U/L (ref 10–35)
BILIRUB SERPL-MCNC: 0.6 MG/DL
BUN SERPL-MCNC: 8.7 MG/DL (ref 6–20)
CALCIUM SERPL-MCNC: 9.6 MG/DL (ref 8.6–10)
CHLORIDE SERPL-SCNC: 102 MMOL/L (ref 98–107)
CHOLEST SERPL-MCNC: 180 MG/DL
CREAT SERPL-MCNC: 0.76 MG/DL (ref 0.51–0.95)
DEPRECATED HCO3 PLAS-SCNC: 24 MMOL/L (ref 22–29)
ERYTHROCYTE [DISTWIDTH] IN BLOOD BY AUTOMATED COUNT: 12.3 % (ref 10–15)
GFR SERPL CREATININE-BSD FRML MDRD: >90 ML/MIN/1.73M2
GLUCOSE SERPL-MCNC: 92 MG/DL (ref 70–99)
HBA1C MFR BLD: 4.8 % (ref 0–5.6)
HCT VFR BLD AUTO: 41.8 % (ref 35–47)
HDLC SERPL-MCNC: 98 MG/DL
HGB BLD-MCNC: 14.6 G/DL (ref 11.7–15.7)
LDLC SERPL CALC-MCNC: 71 MG/DL
MCH RBC QN AUTO: 33.6 PG (ref 26.5–33)
MCHC RBC AUTO-ENTMCNC: 34.9 G/DL (ref 31.5–36.5)
MCV RBC AUTO: 96 FL (ref 78–100)
NONHDLC SERPL-MCNC: 82 MG/DL
PLATELET # BLD AUTO: 260 10E3/UL (ref 150–450)
POTASSIUM SERPL-SCNC: 4.2 MMOL/L (ref 3.4–5.3)
PROT SERPL-MCNC: 7.4 G/DL (ref 6.4–8.3)
RBC # BLD AUTO: 4.34 10E6/UL (ref 3.8–5.2)
SODIUM SERPL-SCNC: 138 MMOL/L (ref 136–145)
TRIGL SERPL-MCNC: 54 MG/DL
TSH SERPL DL<=0.005 MIU/L-ACNC: 1.27 UIU/ML (ref 0.3–4.2)
WBC # BLD AUTO: 5.6 10E3/UL (ref 4–11)

## 2023-05-01 PROCEDURE — 99396 PREV VISIT EST AGE 40-64: CPT | Performed by: FAMILY MEDICINE

## 2023-05-01 PROCEDURE — 80061 LIPID PANEL: CPT | Performed by: FAMILY MEDICINE

## 2023-05-01 PROCEDURE — 36415 COLL VENOUS BLD VENIPUNCTURE: CPT | Performed by: FAMILY MEDICINE

## 2023-05-01 PROCEDURE — 86706 HEP B SURFACE ANTIBODY: CPT | Performed by: FAMILY MEDICINE

## 2023-05-01 PROCEDURE — 84443 ASSAY THYROID STIM HORMONE: CPT | Performed by: FAMILY MEDICINE

## 2023-05-01 PROCEDURE — 80053 COMPREHEN METABOLIC PANEL: CPT | Performed by: FAMILY MEDICINE

## 2023-05-01 PROCEDURE — 83036 HEMOGLOBIN GLYCOSYLATED A1C: CPT | Performed by: FAMILY MEDICINE

## 2023-05-01 PROCEDURE — 85027 COMPLETE CBC AUTOMATED: CPT | Performed by: FAMILY MEDICINE

## 2023-05-01 RX ORDER — DOXYCYCLINE 100 MG/1
100 CAPSULE ORAL 2 TIMES DAILY
Qty: 28 CAPSULE | Refills: 0 | Status: SHIPPED | OUTPATIENT
Start: 2023-05-01 | End: 2023-05-19

## 2023-05-01 ASSESSMENT — ANXIETY QUESTIONNAIRES
7. FEELING AFRAID AS IF SOMETHING AWFUL MIGHT HAPPEN: SEVERAL DAYS
GAD7 TOTAL SCORE: 7
7. FEELING AFRAID AS IF SOMETHING AWFUL MIGHT HAPPEN: SEVERAL DAYS
IF YOU CHECKED OFF ANY PROBLEMS ON THIS QUESTIONNAIRE, HOW DIFFICULT HAVE THESE PROBLEMS MADE IT FOR YOU TO DO YOUR WORK, TAKE CARE OF THINGS AT HOME, OR GET ALONG WITH OTHER PEOPLE: SOMEWHAT DIFFICULT
5. BEING SO RESTLESS THAT IT IS HARD TO SIT STILL: SEVERAL DAYS
4. TROUBLE RELAXING: SEVERAL DAYS
GAD7 TOTAL SCORE: 7
6. BECOMING EASILY ANNOYED OR IRRITABLE: SEVERAL DAYS
1. FEELING NERVOUS, ANXIOUS, OR ON EDGE: SEVERAL DAYS
GAD7 TOTAL SCORE: 7
2. NOT BEING ABLE TO STOP OR CONTROL WORRYING: SEVERAL DAYS
3. WORRYING TOO MUCH ABOUT DIFFERENT THINGS: SEVERAL DAYS
8. IF YOU CHECKED OFF ANY PROBLEMS, HOW DIFFICULT HAVE THESE MADE IT FOR YOU TO DO YOUR WORK, TAKE CARE OF THINGS AT HOME, OR GET ALONG WITH OTHER PEOPLE?: SOMEWHAT DIFFICULT

## 2023-05-01 ASSESSMENT — ENCOUNTER SYMPTOMS
PALPITATIONS: 0
PARESTHESIAS: 0
ARTHRALGIAS: 0
SORE THROAT: 0
FEVER: 0
EYE PAIN: 0
DYSURIA: 0
BREAST MASS: 0
DIARRHEA: 0
HEARTBURN: 0
NAUSEA: 0
FREQUENCY: 0
MYALGIAS: 0
CHILLS: 0
HEADACHES: 0
HEMATURIA: 0
COUGH: 0
CONSTIPATION: 0
JOINT SWELLING: 0
DIZZINESS: 0
NERVOUS/ANXIOUS: 0
SHORTNESS OF BREATH: 0
WEAKNESS: 0
HEMATOCHEZIA: 0
ABDOMINAL PAIN: 0

## 2023-05-01 ASSESSMENT — PATIENT HEALTH QUESTIONNAIRE - PHQ9
SUM OF ALL RESPONSES TO PHQ QUESTIONS 1-9: 8
SUM OF ALL RESPONSES TO PHQ QUESTIONS 1-9: 8
10. IF YOU CHECKED OFF ANY PROBLEMS, HOW DIFFICULT HAVE THESE PROBLEMS MADE IT FOR YOU TO DO YOUR WORK, TAKE CARE OF THINGS AT HOME, OR GET ALONG WITH OTHER PEOPLE: SOMEWHAT DIFFICULT

## 2023-05-01 ASSESSMENT — PAIN SCALES - GENERAL: PAINLEVEL: MILD PAIN (2)

## 2023-05-01 NOTE — PROGRESS NOTES
SUBJECTIVE:   CC: Samantha is an 48 year old who presents for preventive health visit.       2023    11:12 AM   Additional Questions   Roomed by Nicole RUBI     Patient has been advised of split billing requirements and indicates understanding: Yes  Healthy Habits:     Getting at least 3 servings of Calcium per day:  Yes    Bi-annual eye exam:  Yes    Dental care twice a year:  Yes    Sleep apnea or symptoms of sleep apnea:  None    Diet:  Regular (no restrictions)    Frequency of exercise:  4-5 days/week    Duration of exercise:  45-60 minutes    Medication side effects:  Not applicable    PHQ-2 Total Score: 2    Additional concerns today:  Yes (has ingrown hair on left side of grown, it popped since last summer, is intermittent, tender, some pus and blood)    Today's PHQ-2 Score:       2023     9:19 AM   PHQ-2 (  [employer])   Q1: Little interest or pleasure in doing things 1   Q2: Feeling down, depressed or hopeless 1   PHQ-2 Score 2   Q1: Little interest or pleasure in doing things Several days    Several days   Q2: Feeling down, depressed or hopeless Several days    Several days   PHQ-2 Score 2    2     Social History     Tobacco Use     Smoking status: Never     Smokeless tobacco: Never   Vaping Use     Vaping status: Never Used   Substance Use Topics     Alcohol use: Yes     Alcohol/week: 1.0 - 3.0 standard drink of alcohol     Comment: socially         2023     9:19 AM   Alcohol Use   Prescreen: >3 drinks/day or >7 drinks/week? No     Reviewed orders with patient.  Reviewed health maintenance and updated orders accordingly - Yes  Lab work is in process    Breast Cancer Screenin/31/2021     9:17 AM 2022     1:04 PM   Breast CA Risk Assessment (FHS-7)   Do you have a family history of breast, colon, or ovarian cancer? No / Unknown No / Unknown     History of abnormal Pap smear: NO - age 30-65 PAP every 5 years with negative HPV co-testing recommended      Latest Ref Rng & Units  "12/20/2018    11:47 AM 12/20/2018    11:05 AM 7/18/2013    12:00 AM   PAP / HPV   PAP (Historical)   NIL   NIL     HPV 16 DNA NEG^Negative Negative       HPV 18 DNA NEG^Negative Negative       Other HR HPV NEG^Negative Negative         Reviewed and updated as needed this visit by clinical staff   Tobacco  Allergies  Meds  Problems  Med Hx  Surg Hx  Fam Hx        Reviewed and updated as needed this visit by Provider   Tobacco  Allergies  Meds  Problems  Med Hx  Surg Hx  Fam Hx             Review of Systems   Constitutional: Negative for chills and fever.   HENT: Negative for congestion, ear pain, hearing loss and sore throat.    Eyes: Positive for visual disturbance. Negative for pain.   Respiratory: Negative for cough and shortness of breath.    Cardiovascular: Negative for chest pain, palpitations and peripheral edema.   Gastrointestinal: Negative for abdominal pain, constipation, diarrhea, heartburn, hematochezia and nausea.   Breasts:  Negative for tenderness, breast mass and discharge.   Genitourinary: Positive for genital sores. Negative for dysuria, frequency, hematuria, pelvic pain, urgency, vaginal bleeding and vaginal discharge.   Musculoskeletal: Negative for arthralgias, joint swelling and myalgias.   Skin: Negative for rash.   Neurological: Negative for dizziness, weakness, headaches and paresthesias.   Psychiatric/Behavioral: Negative for mood changes. The patient is not nervous/anxious.      OBJECTIVE:   /80 (BP Location: Left arm, Patient Position: Sitting, Cuff Size: Adult Regular)   Pulse 68   Temp 97.9  F (36.6  C) (Temporal)   Resp 16   Ht 1.721 m (5' 7.75\")   Wt 65.8 kg (145 lb)   LMP  (LMP Unknown)   SpO2 100%   BMI 22.21 kg/m    Physical Exam  GENERAL: healthy, alert and no distress  EYES: Eyes grossly normal to inspection, PERRL and conjunctivae and sclerae normal  HENT: ear canals and TM's normal, nose and mouth without ulcers or lesions  NECK: no adenopathy, no " asymmetry, masses, or scars and thyroid normal to palpation  RESP: lungs clear to auscultation - no rales, rhonchi or wheezes  BREAST: normal without masses, tenderness or nipple discharge and no palpable axillary masses or adenopathy  CV: regular rate and rhythm, normal S1 S2, no S3 or S4, no murmur, click or rub, no peripheral edema and peripheral pulses strong  ABDOMEN: soft, nontender, no hepatosplenomegaly, no masses and bowel sounds normal  MS: no gross musculoskeletal defects noted, no edema  SKIN: three small superficial carbuncles on the left inguinal fold. Already draining.   NEURO: Normal strength and tone, mentation intact and speech normal  PSYCH: mentation appears normal, affect normal/bright    Diagnostic Test Results:  Labs reviewed in Epic    ASSESSMENT/PLAN:       ICD-10-CM    1. Routine general medical examination at a health care facility  Z00.00 Lipid panel reflex to direct LDL Fasting     Comprehensive metabolic panel     CBC with platelets     Hemoglobin A1c     TSH with free T4 reflex     Lipid panel reflex to direct LDL Fasting     Comprehensive metabolic panel     CBC with platelets     Hemoglobin A1c     TSH with free T4 reflex      2. Encounter for screening mammogram for malignant neoplasm of breast  Z12.31 MA Screening Digital Bilateral      3. Immunity status testing  Z01.84 Hepatitis B Surface Antibody     Hepatitis B Surface Antibody      4. Carbuncle  L02.93 doxycycline hyclate (VIBRAMYCIN) 100 MG capsule        Reviewed overdue health maintenance topics with patient.  -Patient declined Pap smear today.  She will return to clinic in December for routine Pap smear.    Patient has been advised of split billing requirements and indicates understanding: Yes      COUNSELING:  Reviewed preventive health counseling, as reflected in patient instructions       Regular exercise       Healthy diet/nutrition    She reports that she has never smoked. She has never used smokeless  tobacco.      Gabbi Christina MD  [facility]

## 2023-05-01 NOTE — RESULT ENCOUNTER NOTE
Dear Samantha,   Here are your recent results. All results are within normal limits.    Best Regards   Gabbi Christina MD

## 2023-05-02 LAB
HBV SURFACE AB SERPL IA-ACNC: 0 M[IU]/ML
HBV SURFACE AB SERPL IA-ACNC: NONREACTIVE M[IU]/ML

## 2023-05-04 NOTE — RESULT ENCOUNTER NOTE
Dear Samantha,   Your results revealed that you do not have immunity against hepatitis B. Lack of immunity puts you at risk of kaitlynn hepatitis B infection in the future.  I would recommend scheduling a nurse only visit for immunizations.     Best Regards  Gabbi Christina MD

## 2023-05-08 ENCOUNTER — MYC MEDICAL ADVICE (OUTPATIENT)
Dept: FAMILY MEDICINE | Facility: CLINIC | Age: 48
End: 2023-05-08
Payer: COMMERCIAL

## 2023-05-08 ENCOUNTER — VIRTUAL VISIT (OUTPATIENT)
Dept: PSYCHOLOGY | Facility: CLINIC | Age: 48
End: 2023-05-08
Attending: FAMILY MEDICINE
Payer: COMMERCIAL

## 2023-05-08 DIAGNOSIS — F88 DEVELOPMENTAL MENTAL DISORDER: Primary | ICD-10-CM

## 2023-05-08 PROCEDURE — 90834 PSYTX W PT 45 MINUTES: CPT | Mod: VID | Performed by: PSYCHOLOGIST

## 2023-05-08 ASSESSMENT — COLUMBIA-SUICIDE SEVERITY RATING SCALE - C-SSRS
TOTAL  NUMBER OF ABORTED OR SELF INTERRUPTED ATTEMPTS LIFETIME: NO
2. HAVE YOU ACTUALLY HAD ANY THOUGHTS OF KILLING YOURSELF?: NO
ATTEMPT LIFETIME: NO
1. HAVE YOU WISHED YOU WERE DEAD OR WISHED YOU COULD GO TO SLEEP AND NOT WAKE UP?: NO
6. HAVE YOU EVER DONE ANYTHING, STARTED TO DO ANYTHING, OR PREPARED TO DO ANYTHING TO END YOUR LIFE?: NO
TOTAL  NUMBER OF INTERRUPTED ATTEMPTS LIFETIME: NO

## 2023-05-08 ASSESSMENT — PATIENT HEALTH QUESTIONNAIRE - PHQ9
SUM OF ALL RESPONSES TO PHQ QUESTIONS 1-9: 9
10. IF YOU CHECKED OFF ANY PROBLEMS, HOW DIFFICULT HAVE THESE PROBLEMS MADE IT FOR YOU TO DO YOUR WORK, TAKE CARE OF THINGS AT HOME, OR GET ALONG WITH OTHER PEOPLE: SOMEWHAT DIFFICULT
SUM OF ALL RESPONSES TO PHQ QUESTIONS 1-9: 9

## 2023-05-08 NOTE — PROGRESS NOTES
[facility]   Mental Health & Addiction Services     Progress Note - Initial Visit    Client Name:  Samantha Meier Date: May 8, 2023         Service Type: Individual     Visit Start Time: 11:00am  Visit End Time: 11:50am    Visit #: 1    Attendees: Client attended alone    Service Modality:  Video Visit:      Provider verified identity through the following two step process.  Patient provided:  Patient  and Patient address    Telemedicine Visit: The patient's condition can be safely assessed and treated via synchronous audio and visual telemedicine encounter.      Reason for Telemedicine Visit: Services only offered telehealth    Originating Site (Patient Location): Patient's home    Distant Site (Provider Location): Provider Remote Setting- Home Office    Consent:  The patient/guardian has verbally consented to: the potential risks and benefits of telemedicine (video visit) versus in person care; bill my insurance or make self-payment for services provided; and responsibility for payment of non-covered services.     Patient would like the video invitation sent by:  Send to e-mail at: [email or web address]    Mode of Communication:  [facility] Conference [facility]    Distant Location (Provider):  Off-site    As the provider I attest to compliance with applicable laws and regulations related to telemedicine.       DATA:  Extended Session (53+ minutes): No  Interactive Complexity: No   Crisis: No     Presenting Concerns/Current Stressors:   Patient presented to session to initiate the ADHD evaluation process.           1/3/2022     1:21 PM 2023     9:14 AM 2023    10:35 AM   PHQ   PHQ-9 Total Score 6 8 9   Q9: Thoughts of better off dead/self-harm past 2 weeks Not at all Not at all Several days   F/U: Thoughts of suicide or self-harm   No   F/U: Safety concerns   No            2/3/2021    10:30 AM 1/3/2022     1:21 PM 2023     9:15 AM   SHAHZAD-7 SCORE   Total Score 7 (mild  anxiety) 7 (mild anxiety) 7 (mild anxiety)   Total Score 7 7 7       ASSESSMENT:  Mental Status Assessment:  Appearance:   Appropriate   Eye Contact:   Good   Psychomotor Behavior: Normal   Attitude:   Cooperative   Orientation:   All  Speech   Rate / Production: Normal/ Responsive   Volume:  Normal   Mood:    Normal  Affect:    Appropriate   Thought Content:  Clear   Thought Form:  Logical   Insight:    Good       Safety Issues and Plan for Safety and Risk Management:   [place] Suicide Severity Rating Scale (Lifetime/Recent)      11/30/2020    12:00 PM 5/8/2023    11:03 AM   [place] Suicide Severity Rating (Lifetime/Recent)   Wish to be Dead (Lifetime) No    Non-Specific Active Suicidal Thoughts (Lifetime) No    Most Severe Ideation Rating (Lifetime) NA    RETIRED: 1. Wish to be Dead (Recent) No    RETIRED: 2. Non-Specific Active Suicidal Thoughts (Recent) No    3. Active Suicidal Ideation with any Methods (Not Plan) Without Intent to Act (Lifetime) No    RETIRED: 3. Active Suicidal Ideation with any Methods (Not Plan) Without Intent to Act (Recent) No    RETIRE: 4. Active Suicidal Ideation with Some Intent to Act, Without Specific Plan (Lifetime) No    4. Active Suicidal Ideation with Some Intent to Act, Without Specific Plan (Recent) No    RETIRE: 5. Active Suicidal Ideation with Specific Plan and Intent (Lifetime) No    RETIRED: 5. Active Suicidal Ideation with Specific Plan and Intent (Recent) No    Most Severe Ideation Rating (Past Month) NA    Actual Attempt (Lifetime) No    Actual Attempt (Past 3 Months) No    Has subject engaged in non-suicidal self-injurious behavior? (Lifetime) No    Has subject engaged in non-suicidal self-injurious behavior? (Past 3 Months) No    Interrupted Attempts (Lifetime) No    Interrupted Attempts (Past 3 Months) No    Aborted or Self-Interrupted Attempt (Lifetime) No    Aborted or Self-Interrupted Attempt (Past 3 Months) No    Preparatory Acts or Behavior (Lifetime) No     Preparatory Acts or Behavior (Past 3 Months) No    1. Wish to be Dead (Lifetime)  N   2. Non-Specific Active Suicidal Thoughts (Lifetime)  N   Actual Attempt (Lifetime)  N   Has subject engaged in non-suicidal self-injurious behavior? (Lifetime)  N   Interrupted Attempts (Lifetime)  N   Aborted or Self-Interrupted Attempt (Lifetime)  N   Preparatory Acts or Behavior (Lifetime)  N   Calculated C-SSRS Risk Score (Lifetime/Recent)  No Risk Indicated     Patient denies current fears or concerns for personal safety.  Patient denies current or recent suicidal ideation or behaviors.  Patient denies current or recent homicidal ideation or behaviors.  Patient denies current or recent self injurious behavior or ideation.  Patient denies other safety concerns.  Recommended that patient call 911 or go to the local ED should there be a change in any of these risk factors.   Patient reports there are firearms in the house. The firearms are secured in a locked space.    Diagnostic Criteria:  F88:  A. A persistent pattern of inattention and/or hyperactivity-impulsivity that interferes with functioning or development, as characterized by (1) and/or (2):   1. Six or more inattention symptoms that have persisted for at least 6 months to a degree that is inconsistent with developmental level and that negatively impacts directly on social and academic/occupational activities.   2. Six or more hyperactivity and impulsivity symptoms that have persisted for at least 6 months to a degree that is inconsistent with developmental level and that negatively impacts directly on social and academic/occupational activities.  B. Several symptoms (inattentive or hyperactive/impulsive) were present before the age of 12 years.  C. Several symptoms (inattentive or hyperactive/impulsive) present in ?2 settings (eg, at home, school, or work; with friends or relatives; in other activities).  D. There is clear evidence that the symptoms interfere with or  "reduce the quality of social, academic, or occupational functioning.  E. Symptoms do not occur exclusively during the course of schizophrenia or another psychotic disorder, and are not better explained by another mental disorder (eg, mood disorder, anxiety disorder, dissociative disorder, personality disorder, substance intoxication, or withdrawal).      DSM5 Diagnoses: (Sustained by DSM5 Criteria Listed Above)  Diagnoses:   1. Developmental mental disorder    Rule out bipolar disorder  Psychosocial & Contextual Factors: social support, employed  WHODAS 2.0 (12 item):       11/27/2020     2:31 PM 1/1/2021    10:03 AM   WHODAS 2.0 Total Score   Total Score 29 24   Total Score MyChart 29 24       PROMIS-10 Scores  Global Mental Health Score: (P) 13  Global Physical Health Score: (P) 17   PROMIS TOTAL - SUBSCORES: (P) 30      Intervention:              Reviewed symptoms and history of presenting concern. Patient endorsed symptoms consistent with depression , niesha and ADHD. Ongoing assessment of bipolar. Certainly seemed as though some manic symptoms present. Extended periods of feeling \"enlightened,\" some goal directed behavior, flight of ideas. Patient denied symptoms associated with anxiety , panic, OCD and perceptual difficulties. DA in chart 11/30/2020.  CBT: socratic questioning, positive reinforcement  EFT: empathetic attunement, emotion checking, emotion naming  MI: open ended questions, affirmations, reflections        Attendance Agreement:  Client has not signed the attendance agreement. Discussed expectations at beginning of this first session and patient agreed.       PLAN:  Patient will complete Rika questionnaires  and MMPI-2 prior to next session (5/23/2023).    Patient meets the following risk assessment and triage: Patient denied any current/recent/lifetime history of suicidal ideation and/or behaviors.  No safety plan indicated at this time.     Medical necessity criteria is warranted in order to: " Measure a psychological disorder and its severity and functional impairment to determine psychiatric diagnosis when a mental illness is suspected, or to achieve a differential diagnosis from a range of medical/psychological disorders that present with similar constellations of symptoms (e.g., determination and measurement of anxiety severity and impact in the presence of ongoing asthma or heart disease), Perform symptom measurement to objectively measure treatment effectiveness and/or determine the need to refer for pharmacological treatment or other medical evaluation (e.g., based on severity and chronicity of symptoms) and Evaluate primary symptoms of impaired attention and concentration that can occur in many neurological and psychiatric conditions.    Medical necessity for psychological assessment is warranted as a result of the following: (1) A specific clinical question is posed that relates to the condition/symptoms being addressed (2) The question cannot be adequately addressed by clinical interview and/or behavioral observation (3) Results of psychological testing are reasonably expected to provide an answer to the query (4) It is reasonably expected that the testing will provide information leading to a clearer diagnosis and/or guide treatment planning with an expectation of improved clinical outcome.    I acknowledge that, based upon current clinical information, the patient and I have reviewed and discussed issues pertaining to the purpose of therapy/testing, potential therapeutic goals, procedures, risks and benefits, and estimated duration of therapy/testing. Issues pertaining to fees/insurance and confidentiality were also addressed with the patient, who indicated understanding and elected to continue with appointments. I will not be providing any experimental procedures and, if we agree that a change in clinical procedure would be more beneficial, I will obtain specific consent for that procedure or  refer you to another provider who has expertise in that area.       Adilia Ray PsyD, LP  Clinical Psychologist

## 2023-05-09 NOTE — TELEPHONE ENCOUNTER
The doxycycline can cause this , that is why it is recommended to be taken with a ful glass of water and not to lay down for at least half an hour   I would recommend to be seen if the pain persists , UC to start with   Can you let her know this ?  Thanks

## 2023-05-17 ENCOUNTER — MYC MEDICAL ADVICE (OUTPATIENT)
Dept: FAMILY MEDICINE | Facility: CLINIC | Age: 48
End: 2023-05-17
Payer: COMMERCIAL

## 2023-05-19 ENCOUNTER — MYC REFILL (OUTPATIENT)
Dept: FAMILY MEDICINE | Facility: CLINIC | Age: 48
End: 2023-05-19
Payer: COMMERCIAL

## 2023-05-19 DIAGNOSIS — L02.93 CARBUNCLE: ICD-10-CM

## 2023-05-19 RX ORDER — DOXYCYCLINE 100 MG/1
100 CAPSULE ORAL 2 TIMES DAILY
Qty: 28 CAPSULE | Refills: 0 | Status: SHIPPED | OUTPATIENT
Start: 2023-05-19 | End: 2024-05-03

## 2023-05-19 NOTE — TELEPHONE ENCOUNTER
FS,    Routing refill request to provider for review/approval because:  Medication had an end date of 05/15/2023  Patient requesting refill  Also sent [employer] messages  Please advise    Thanks,  FARTUN Chen

## 2023-05-23 ENCOUNTER — VIRTUAL VISIT (OUTPATIENT)
Dept: PSYCHOLOGY | Facility: CLINIC | Age: 48
End: 2023-05-23
Attending: FAMILY MEDICINE
Payer: COMMERCIAL

## 2023-05-23 DIAGNOSIS — F88 DEVELOPMENTAL MENTAL DISORDER: Primary | ICD-10-CM

## 2023-05-23 PROCEDURE — 90834 PSYTX W PT 45 MINUTES: CPT | Mod: VID | Performed by: PSYCHOLOGIST

## 2023-05-23 NOTE — PROGRESS NOTES
[facility]   Mental Health & Addiction Services    Progress Note    Patient Name: Samantha Meier  Date: May 23, 2023       Service Type:  Second ADHD testing appointment      Session Start Time: 5:00pm  Session End Time:  5:38pm     Session Length: 38 minutes    Session #: 2    Attendees: Client attended alone    Service Modality:  Video Visit:      Provider verified identity through the following two step process.  Patient provided:  Patient  and Patient address    Telemedicine Visit: The patient's condition can be safely assessed and treated via synchronous audio and visual telemedicine encounter.      Reason for Telemedicine Visit: Services only offered telehealth    Originating Site (Patient Location): Patient's home    Distant Site (Provider Location): Provider Remote Setting- Home Office    Consent:  The patient/guardian has verbally consented to: the potential risks and benefits of telemedicine (video visit) versus in person care; bill my insurance or make self-payment for services provided; and responsibility for payment of non-covered services.     Patient would like the video invitation sent by:  Send to e-mail at: [email or web address]    Mode of Communication:  Video Conference via [employer]well    Distant Location (Provider):  Off-site    As the provider I attest to compliance with applicable laws and regulations related to telemedicine.      DATA  Interactive Complexity: No  Crisis: No       Progress Since Last Session (Related to Symptoms / Goals / Homework):   Symptoms: Stable, no change.     Homework: Patient completed Rika questionnaires and MMPI-3.    Episode of Care Goals: Satisfactory progress - ACTION (Actively working towards change); Intervened by reinforcing change plan / affirming steps taken     Current / Ongoing Stressors and Concerns:   Discussed manifestations of attention and concentration problems in various areas of her life.                   Treatment Objective(s) Addressed in This Session:          Continued with testing process - patient was emailed the MMPI-2 and will complete before feedback.                 Intervention:              Continued information gathering specific to ADHD and mood symptoms. Briefly reviewed Rika questionnaires. Discussed coping skills for attention and concentration problems. Ended session by discussing MMPI-2 and feedback process.   CBT: socratic questioning, positive reinforcement, thought challenging, cognitive reframe  EFT: empathetic attunement, emotion checking, emotion modeling, emotion naming  MI: open ended questions, affirmations, reflections    Assessments completed prior to visit:  The following assessments were completed by patient for this visit:  PHQ9:       11/27/2020     2:29 PM 1/1/2021     9:57 AM 1/18/2021     1:48 PM 2/3/2021    10:30 AM 1/3/2022     1:21 PM 5/1/2023     9:14 AM 5/8/2023    10:35 AM   PHQ-9 SCORE   PHQ-9 Total Score MyChart 11 (Moderate depression) 11 (Moderate depression) 13 (Moderate depression) 7 (Mild depression) 6 (Mild depression) 8 (Mild depression) 9 (Mild depression)   PHQ-9 Total Score 11 11 13 7 6 8 9     GAD7:       11/25/2020     2:39 PM 11/27/2020     2:27 PM 1/1/2021     9:58 AM 1/18/2021     1:48 PM 2/3/2021    10:30 AM 1/3/2022     1:21 PM 5/1/2023     9:15 AM   SHAHZAD-7 SCORE   Total Score 11 (moderate anxiety) 12 (moderate anxiety) 5 (mild anxiety) 12 (moderate anxiety) 7 (mild anxiety) 7 (mild anxiety) 7 (mild anxiety)   Total Score 11 12 5 12 7 7 7     CAGE-AID:       5/8/2023    10:47 AM   CAGE-AID Total Score   Total Score 2   Total Score MyChart 2 (A total score of 2 or greater is considered clinically significant)     PROMIS 10-Global Health (only subscores and total score):       5/8/2023    10:47 AM 5/17/2023     9:22 AM   PROMIS-10 Scores Only   Global Mental Health Score 13 12   Global Physical Health Score 17 15   PROMIS TOTAL - SUBSCORES  30 27     Rich Suicide Severity Rating Scale (Lifetime/Recent)      11/30/2020    12:00 PM 5/8/2023    11:03 AM   Rich Suicide Severity Rating (Lifetime/Recent)   Wish to be Dead (Lifetime) No    Non-Specific Active Suicidal Thoughts (Lifetime) No    Most Severe Ideation Rating (Lifetime) NA    RETIRED: 1. Wish to be Dead (Recent) No    RETIRED: 2. Non-Specific Active Suicidal Thoughts (Recent) No    3. Active Suicidal Ideation with any Methods (Not Plan) Without Intent to Act (Lifetime) No    RETIRED: 3. Active Suicidal Ideation with any Methods (Not Plan) Without Intent to Act (Recent) No    RETIRE: 4. Active Suicidal Ideation with Some Intent to Act, Without Specific Plan (Lifetime) No    4. Active Suicidal Ideation with Some Intent to Act, Without Specific Plan (Recent) No    RETIRE: 5. Active Suicidal Ideation with Specific Plan and Intent (Lifetime) No    RETIRED: 5. Active Suicidal Ideation with Specific Plan and Intent (Recent) No    Most Severe Ideation Rating (Past Month) NA    Actual Attempt (Lifetime) No    Actual Attempt (Past 3 Months) No    Has subject engaged in non-suicidal self-injurious behavior? (Lifetime) No    Has subject engaged in non-suicidal self-injurious behavior? (Past 3 Months) No    Interrupted Attempts (Lifetime) No    Interrupted Attempts (Past 3 Months) No    Aborted or Self-Interrupted Attempt (Lifetime) No    Aborted or Self-Interrupted Attempt (Past 3 Months) No    Preparatory Acts or Behavior (Lifetime) No    Preparatory Acts or Behavior (Past 3 Months) No    1. Wish to be Dead (Lifetime)  N   2. Non-Specific Active Suicidal Thoughts (Lifetime)  N   Actual Attempt (Lifetime)  N   Has subject engaged in non-suicidal self-injurious behavior? (Lifetime)  N   Interrupted Attempts (Lifetime)  N   Aborted or Self-Interrupted Attempt (Lifetime)  N   Preparatory Acts or Behavior (Lifetime)  N   Calculated C-SSRS Risk Score (Lifetime/Recent)  No Risk Indicated        ASSESSMENT:  Current Emotional / Mental Status (status of significant symptoms):   Risk status (Self / Other harm or suicidal ideation)   Patient denies current fears or concerns for personal safety.   Patient denies current or recent suicidal ideation or behaviors.   Patient denies current or recent homicidal ideation or behaviors.   Patient denies current or recent self injurious behavior or ideation.   Patient denies other safety concerns.   Patient reports there has been no change in risk factors since their last session.     Patient reports there has been no change in protective factors since their last session.     Recommended that patient call 911 or go to the local ED should there be a change in any of these risk factors.     Appearance:   Appropriate    Eye Contact:   Good    Psychomotor Behavior: Normal    Attitude:   Cooperative    Orientation:   All   Speech    Rate / Production: Normal     Volume:  Normal    Mood:    Normal   Affect:    Appropriate    Thought Content:  Clear    Thought Form:  Coherent  Logical    Insight:    Good      Medication Review:   No current psychiatric medications prescribed     Medication Compliance:   NA     Changes in Health Issues:   None reported     Chemical Use Review:   Substance Use: Patient reported usually not having more than 2 alcoholic beverages at a time.  Patient reported that she began using cannabis around 17 years old and loved it.  Then began using LSD and a variety of other psychedelics.  Began using meth at 18 years old and use was consistent until 22 years old.  After getting pregnant at 23, patient ceased use.  Had 1 relapse but has maintained sobriety since.     Nicotine Use: No current tobacco use.      Diagnosis:  1. Developmental mental disorder    Rule out bipolar disorder    Collateral Reports Completed:   Received Rika questionnaire from collaterals ( and mother).      PLAN:   Patient will complete CNS Vital Signs before feedback session is  scheduled. Patient was made aware that the test needs to be completed as soon as possible.  If it is not completed within one and two weeks, email reminders will be sent directly.  The patient was also made aware that the link expires after 7 days.  Patient was in agreement to this plan.      Adilia Ray PsyD, LP  Clinical Psychologist

## 2023-06-21 ENCOUNTER — VIRTUAL VISIT (OUTPATIENT)
Dept: PSYCHOLOGY | Facility: CLINIC | Age: 48
End: 2023-06-21
Payer: COMMERCIAL

## 2023-06-21 DIAGNOSIS — F31.81 BIPOLAR II DISORDER (H): Primary | ICD-10-CM

## 2023-06-21 PROCEDURE — 96131 PSYCL TST EVAL PHYS/QHP EA: CPT | Mod: VID | Performed by: PSYCHOLOGIST

## 2023-06-21 PROCEDURE — 99207 PR NO CHARGE LOS: CPT | Mod: VID | Performed by: PSYCHOLOGIST

## 2023-06-21 PROCEDURE — 96130 PSYCL TST EVAL PHYS/QHP 1ST: CPT | Mod: VID | Performed by: PSYCHOLOGIST

## 2023-06-21 NOTE — PROGRESS NOTES
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 a delay.  She was able to recognize target shapes immediately and after a delay in the average range.  Reasoning scored to be in the low range, as the patient was able to solve nonverbal puzzle matrices comparable to peers.  Processing speed scored to be in the low average range, as the patient was able to scan and respond to visual stimuli slower than peers.  On the Stroop test, the patient was able to respond to stimuli in a timely manner and was able to inhibit the salient, but incorrect, response in the low average range.  On a test of shifting attention, the patient was able to adjust her responses according to changing rule sets in the average range.  As such, complex attention, cognitive flexibility, and executive functioning were all scored to be in the average range.  On a test of continuous performance, the patient was able to natalie her attention and resist making impulsive mistakes in the average range. On CNS Vital Signs, ADHD is associated with significant deficits in attention, processing speed, and executive functioning.  Although the patient demonstrated low average processing speed other, expected deficits were not observed.  This is consistent with the patient's self-reported lack of current inattention and/or hyperactivity symptoms.  As such, current  problems cannot be conceptualized as having a neurodevelopmental basis.  See recommendations below.    Referral Question Response: DSM-5 criteria for ADHD:   A. Symptom Count - Are there sufficient symptoms for the diagnosis?  No, number of symptoms endorsed were subclinical.  B. Onset - Were several symptoms present before 12 years of age?  Unclear, patient reported some inattention symptoms during the clinical interview but denied others.  Reported significant symptoms on Rika measures.  C. Pervasiveness - Are several symptoms present in at least two settings? Yes, patient reported that symptoms are problematic at home and work.   D. Impairment - Do symptoms interfere with or reduce the quality of functioning? Yes, patient is unable to complete daily tasks effectively.   E. Exclusions - Are symptoms better explained by another disorder or factor? Yes, symptoms are better explained by bipolar disorder symptoms. CNS Vital Signs results indicate that difficulties are not explained by an organic basis of inattention.     The patient meets the following DSM-5 criteria for bipolar II disorder:  A. Criteria have been met for at least 1 hypomanic episode.  B. There has never been a manic episode.  C. The occurrence of the hypomanic and major depressive episodes are not better explained by schizoaffective disorder, schizophrenia, schizophreniform disorder, delusional disorder, or other specified or unspecified schizophrenia spectrum or other psychotic disorder.  D. The symptoms of depression or the unpredictability caused by frequent alternation between periods of depression and hypomania causes clinically significant distress or impairment in social, occupational, or other important areas of functioning.   Hypomanic Episode  A.  A distinct period of abnormally and persistently elevated, expansive, or irritable mood and abnormally and persistently increased activity or energy, lasting at least 4 consecutive days and  present most of the day, nearly every day (or any duration if hospitalization is necessary).  B.  During the period of mood disturbance and increased energy or activity, 3 (or more) of the following symptoms (4 if the mood is only irritable) are present to a significant degree and represent a noticeable change from usual behavior:              1.  Inflated self-esteem or grandiosity.              2.  Decreased need for sleep.              3.  More talkative than usual or pressure to keep talking.              4.  Flight of ideas or subjective experience that thoughts are racing.              5.  Distractibility, as reported or observed.  6.  Increase in goal-directed activity (either socially, at work or school, or sexually) or psychomotor agitation.  7. Excessive involvement in activities that have a high potential for painful consequences.   C. The episode is associated with an unequivocal change in functioning that is uncharacteristic of the individual when not symptomatic.     D. The disturbance in mood and the change in functioning are observable by others.   E. The episode is not severe enough to cause marked impairment in social or occupational functioning or to necessitate hospitalization.   F.  The episode is not attributable to the physiological effects of a substance or another medical condition.  Major Depressive Episode  A. Five (or more) symptoms have been present during the same 2-week period and represent a change from previous functioning; at least one of the symptoms is either (1) depressed mood or (2) loss of interest or pleasure.   1. Depressed mood.   2. Diminished interest or pleasure in all, or almost all, activities.   3. Significant appetite change.  4. Significant sleep problems.   5. Psychomotor agitation or retardation.   6. Fatigue or loss of energy.   7. Feelings of worthlessness or inappropriate guilt.   8. Diminished ability to think or concentrate, or indecisiveness.    9. Recurrent  thoughts of death, recurrent suicidal ideation without a specific plan, a suicide attempt, or a specific plan for committing suicide.   B. The symptoms cause clinically significant distress or impairment in social, occupational, or other important areas of functioning.  C. The episode is not attributable to the physiological effects of a substance or to another medical condition.    DIAGNOSES:  F31.81 Bipolar II Disorder    PLAN OF CARE:  1. Discuss the following with your primary care provider:  a. Consider a trial of a psychotropic medication. This may help alleviate some of the patient's depression and anxiety symptoms.     2. Continue individual psychotherapy.    RECOMMENDATIONS:  1. Due to the patient's reported attention, concentration, and mood difficulties, the following health/lifestyle changes when combined, can significantly improve symptoms:   a. Avoid simple carbohydrates at breakfast. Aim for only complex carbohydrates and lean protein for your morning meal.   b. Engage in aerobic exercise 3 times per week for 30 minutes, ensuring that your heart rate stays within your training zone. Further, reading the book,  Spark,  by Guy Sparks M.D can help the patient understand the benefits of exercise on the brain.   c. Research suggest that taking a high-quality multi-vitamin and antioxidant (1/2 cup of blueberries) daily in conjunction with balanced nutrition can be helpful.  d. Aim for the high end of daily water intake: around 72 ounces per day.  e. Ensure regular meals and snacks to maintain optimal attention.    2. The following may be beneficial in managing some of the patient's attention and concentration difficulties:  a. Due to the patient's difficulties with attention and concentration, consider working in a completely distraction-free area while completing tasks. Workspaces should be completely clear except for the materials needed for the current task. Both visual and auditory distractions should  "be decreased as much as possible.  b. Considering decreased ability to focus and maintain attention, it is recommended that the patient take frequent breaks while completing tasks. This will help to maintain attention and effort. The patient may benefit from the use of a SOHM Timer. The timer works by using built-in break times. After working on a task consistently for 25 minutes, the timer reminds the user to take a five-minute break before continuing, etc. A SOHM timer can be downloaded as a free nimisha to a phone or tablet.  c. Due to the patient's attentional and concentration symptoms, it is recommended to increase organization with the use of lists and calendars. Significantly increasing structure to the day and adhering to a set schedule can increase your ability to complete responsibilities, track deadline, etc. Breaking these tasks down into their component parts and recording them in a calendar/planner will likely be beneficial. Patient would benefit from setting feasible timelines for completion of activities. By establishing clear priorities for completing tasks, you can more likely complete the most important tasks first. The patient may also choose to elect to a friend or family member to help hold them accountable.    3. Avoid multitasking. Attempting to work on multiple tasks and projects the same increases the likelihood that an error will occur. Focus on one task at a time.    4. Develop a \"coping skills jar/box.\" This entails designating a certain container to hold slips of paper with distraction technique ideas written on each slip of paper. Distraction techniques may include listening to a certain type of music, playing on game on your phone, doing a breathing exercise, spending time with a pet, calling a certain individual, looking at a magazine, working on a puzzle, etc. When feeling distressed, choose a slip of paper from the container and engage in that activity rather than focusing on " the problem.      Adilia Ray PsyD, LP  Clinical Psychologist

## 2023-06-21 NOTE — PROGRESS NOTES
[facility]   Mental Health & Addiction Services     Progress Note - ADHD Feedback Session     Patient Name: Samantha Meier  Date: 2023       Service Type:  Individual       Session Start Time: :00am  Session End Time:  8:24am     Session Length: 24 minutes    Session #: 3    Attendees: Patient attended alone    Service Modality: Video Visit:      Provider verified identity through the following two step process.  Patient provided:  Patient  and Patient address    Telemedicine Visit: The patient's condition can be safely assessed and treated via synchronous audio and visual telemedicine encounter.      Reason for Telemedicine Visit: Services only offered telehealth    Originating Site (Patient Location): Patient's home    Distant Site (Provider Location): Provider Remote Setting- Home Office    Consent:  The patient/guardian has verbally consented to: the potential risks and benefits of telemedicine (video visit) versus in person care; bill my insurance or make self-payment for services provided; and responsibility for payment of non-covered services.     Patient would like the video invitation sent by:  Send to e-mail at: [email or web address]    Mode of Communication:  Video Conference via [facility]    Distant Location (Provider):  Off-site    As the provider I attest to compliance with applicable laws and regulations related to telemedicine.          1/3/2022     1:21 PM 2023     9:14 AM 2023    10:35 AM   PHQ   PHQ-9 Total Score 6 8 9   Q9: Thoughts of better off dead/self-harm past 2 weeks Not at all Not at all Several days   F/U: Thoughts of suicide or self-harm   No   F/U: Safety concerns   No           2/3/2021    10:30 AM 1/3/2022     1:21 PM 2023     9:15 AM   SHAHZAD-7 SCORE   Total Score 7 (mild anxiety) 7 (mild anxiety) 7 (mild anxiety)   Total Score 7 7 7         DATA      Progress Since Last Session (Related to Symptoms /  Goals / Homework):   Symptoms: Stable.    Homework: Completed.      Treatment Objective(s) Addressed in This Session:   Provided feedback on ADHD evaluation. Reviewed test results in depth. Plan of care and recommendations were discussed based on testing data. See full report attached on secondary note in this encounter.     Intervention:   Provided feedback to patient regarding testing results, diagnoses, and treatment recommendations. Test results are not consistent with an ADHD diagnosis. Symptoms are better explained by bipolar disorder. Personalized suggestions regarding symptoms were offered. Patient had the opportunity to ask questions; she expressed understanding.        ASSESSMENT: Current Emotional / Mental Status (status of significant symptoms):   Risk status (Self / Other harm or suicidal ideation)   Patient denies current fears or concerns for personal safety.   Patient denies current or recent suicidal ideation or behaviors.   Patient denies current or recent homicidal ideation or behaviors.   Patient denies current or recent self injurious behavior or ideation.   Patient denies other safety concerns.   Patient reports there has been no change in risk factors since their last session.     Patient reports there has been no change in protective factors since their last session.     Recommended that patient call 911 or go to the local ED should there be a change in any of these risk factors.     Appearance:   Appropriate    Eye Contact:   Good    Psychomotor Behavior: Normal    Attitude:   Cooperative    Orientation:   All   Speech    Rate / Production: Normal     Volume:  Normal    Mood:    Normal   Affect:    Appropriate    Thought Content:  Clear    Thought Form:  Coherent  Logical    Insight:    Good      Medication Review:   No current psychiatric medications prescribed     Medication Compliance:   NA     Changes in Health Issues:   None reported     Chemical Use Review:   Substance Use: See  report.     Nicotine Use: No current tobacco use.      Diagnosis:  1. Bipolar II disorder (H)        PLAN:   Recommendations are outlined in full evaluation report (attached to this encounter).   Patient indicated understanding and will contact the clinic if there are further questions.    Parts of this documentation may have been completed using dictation software. Potential errors may result and are unintentional.       Adilia Ray PsyD, LP  Clinical Psychologist         Psychological Testing Services Summary       Testing Evaluation Services Base: 12949  (first 60 mins) Add-on: 86441  (each addtl 60 mins)   Record Review and Clarify Referral Question   7:50am-8:00am on 5/8/2023 10 minutes   Clinical Decision Making/Battery Modification   4:45pm-5:00pm on 5/23/2023 15 minutes   Integration/Report Generation   5:30pm-6:30pm on 6/19/2023 ([facility])  6:30pm-7:00pm on 6/19/2023 (CNS Vital Signs)  7:00pm-7:45pm on 6/19/2023 (MMPI-2)  7:45pm-9:00pm on 6/19/2023 (Final Report)   60 minutes  30 minutes  45 minutes  75 minutes   Interactive Feedback Session   8:00am-8:24am on 6/20/2023 24 minutes   Post-Service Work   8:24am-8:39am on 6/21/2023 15 minutes   Total Time: 274 minutes   Total Units: 1 4       Diagnoses:   F31.81 Bipolar II Disorder

## 2023-07-10 ENCOUNTER — ANCILLARY PROCEDURE (OUTPATIENT)
Dept: MAMMOGRAPHY | Facility: CLINIC | Age: 48
End: 2023-07-10
Attending: FAMILY MEDICINE
Payer: COMMERCIAL

## 2023-07-10 DIAGNOSIS — Z12.31 ENCOUNTER FOR SCREENING MAMMOGRAM FOR MALIGNANT NEOPLASM OF BREAST: ICD-10-CM

## 2023-07-10 PROCEDURE — 77067 SCR MAMMO BI INCL CAD: CPT

## 2023-07-10 PROCEDURE — 77067 SCR MAMMO BI INCL CAD: CPT | Mod: 26 | Performed by: STUDENT IN AN ORGANIZED HEALTH CARE EDUCATION/TRAINING PROGRAM

## 2024-04-01 ENCOUNTER — PATIENT OUTREACH (OUTPATIENT)
Dept: CARE COORDINATION | Facility: CLINIC | Age: 49
End: 2024-04-01
Payer: COMMERCIAL

## 2024-05-02 SDOH — HEALTH STABILITY: PHYSICAL HEALTH: ON AVERAGE, HOW MANY DAYS PER WEEK DO YOU ENGAGE IN MODERATE TO STRENUOUS EXERCISE (LIKE A BRISK WALK)?: 7 DAYS

## 2024-05-02 SDOH — HEALTH STABILITY: PHYSICAL HEALTH: ON AVERAGE, HOW MANY MINUTES DO YOU ENGAGE IN EXERCISE AT THIS LEVEL?: 30 MIN

## 2024-05-02 ASSESSMENT — SOCIAL DETERMINANTS OF HEALTH (SDOH): HOW OFTEN DO YOU GET TOGETHER WITH FRIENDS OR RELATIVES?: TWICE A WEEK

## 2024-05-03 ENCOUNTER — OFFICE VISIT (OUTPATIENT)
Dept: FAMILY MEDICINE | Facility: CLINIC | Age: 49
End: 2024-05-03
Payer: COMMERCIAL

## 2024-05-03 VITALS
TEMPERATURE: 97.8 F | RESPIRATION RATE: 20 BRPM | WEIGHT: 140 LBS | HEIGHT: 68 IN | OXYGEN SATURATION: 99 % | BODY MASS INDEX: 21.22 KG/M2 | DIASTOLIC BLOOD PRESSURE: 82 MMHG | SYSTOLIC BLOOD PRESSURE: 119 MMHG | HEART RATE: 69 BPM

## 2024-05-03 DIAGNOSIS — Z12.4 CERVICAL CANCER SCREENING: ICD-10-CM

## 2024-05-03 DIAGNOSIS — Z12.31 VISIT FOR SCREENING MAMMOGRAM: ICD-10-CM

## 2024-05-03 DIAGNOSIS — Z00.00 ROUTINE ADULT HEALTH MAINTENANCE: Primary | ICD-10-CM

## 2024-05-03 DIAGNOSIS — N90.7 VULVAR CYST: ICD-10-CM

## 2024-05-03 DIAGNOSIS — F31.81 BIPOLAR II DISORDER (H): ICD-10-CM

## 2024-05-03 LAB
ERYTHROCYTE [DISTWIDTH] IN BLOOD BY AUTOMATED COUNT: 12.2 % (ref 10–15)
HCT VFR BLD AUTO: 41.7 % (ref 35–47)
HGB BLD-MCNC: 13.7 G/DL (ref 11.7–15.7)
MCH RBC QN AUTO: 32 PG (ref 26.5–33)
MCHC RBC AUTO-ENTMCNC: 32.9 G/DL (ref 31.5–36.5)
MCV RBC AUTO: 97 FL (ref 78–100)
PLATELET # BLD AUTO: 278 10E3/UL (ref 150–450)
RBC # BLD AUTO: 4.28 10E6/UL (ref 3.8–5.2)
WBC # BLD AUTO: 7.6 10E3/UL (ref 4–11)

## 2024-05-03 PROCEDURE — G0145 SCR C/V CYTO,THINLAYER,RESCR: HCPCS | Performed by: PHYSICIAN ASSISTANT

## 2024-05-03 PROCEDURE — 36415 COLL VENOUS BLD VENIPUNCTURE: CPT | Performed by: PHYSICIAN ASSISTANT

## 2024-05-03 PROCEDURE — 80048 BASIC METABOLIC PNL TOTAL CA: CPT | Performed by: PHYSICIAN ASSISTANT

## 2024-05-03 PROCEDURE — 80061 LIPID PANEL: CPT | Performed by: PHYSICIAN ASSISTANT

## 2024-05-03 PROCEDURE — 99396 PREV VISIT EST AGE 40-64: CPT | Performed by: PHYSICIAN ASSISTANT

## 2024-05-03 PROCEDURE — 99213 OFFICE O/P EST LOW 20 MIN: CPT | Mod: 25 | Performed by: PHYSICIAN ASSISTANT

## 2024-05-03 PROCEDURE — 85027 COMPLETE CBC AUTOMATED: CPT | Performed by: PHYSICIAN ASSISTANT

## 2024-05-03 PROCEDURE — 87624 HPV HI-RISK TYP POOLED RSLT: CPT | Performed by: PHYSICIAN ASSISTANT

## 2024-05-03 NOTE — PROGRESS NOTES
Preventive Care Visit  [facility]  Sirena Mehta PA-C, Physician Assistant - Medical  May 3, 2024      Assessment & Plan     Routine adult health maintenance  - REVIEW OF HEALTH MAINTENANCE PROTOCOL ORDERS  - CBC with platelets  - Basic metabolic panel  - Lipid panel reflex to direct LDL Non-fasting  - CBC with platelets  - Basic metabolic panel  - Lipid panel reflex to direct LDL Non-fasting    Vulvar cyst - recommend GYN for excision  - Ob/Gyn  Referral    Cervical cancer screening  - Pap Screen with HPV - recommended age 30 - 65 years    Visit for screening mammogram  - *MA Screening Digital Bilateral    Bipolar II - follows with psychiatry, no change to treatment plan      Counseling  Appropriate preventive services were discussed with this patient, including applicable screening as appropriate for fall prevention, nutrition, physical activity, Tobacco-use cessation, weight loss and cognition.  Checklist reviewing preventive services available has been given to the patient.  Reviewed patient's diet, addressing concerns and/or questions.   She is at risk for psychosocial distress and has been provided with information to reduce risk.         Prem Singh is a 49 year old, presenting for the following:  Physical    Had vulvar lesion 5/2023 ,treated with abx  Took a long time to resolve  Now persistent scar        5/3/2024     3:51 PM   Additional Questions   Roomed by emilio   Accompanied by self        Health Care Directive  Patient does not have a Health Care Directive or Living Will: Discussed advance care planning with patient; however, patient declined at this time.    HPI      5/2/2024   General Health   How would you rate your overall physical health? Excellent   Feel stress (tense, anxious, or unable to sleep) Only a little   (!) STRESS CONCERN      5/2/2024   Nutrition   Three or more servings of calcium each day? Yes   Diet: Regular (no restrictions)   How many  servings of fruit and vegetables per day? 4 or more   How many sweetened beverages each day? 0-1         5/2/2024   Exercise   Days per week of moderate/strenous exercise 7 days   Average minutes spent exercising at this level 30 min         5/2/2024   Social Factors   Frequency of gathering with friends or relatives Twice a week   Worry food won't last until get money to buy more No   Food not last or not have enough money for food? No   Do you have housing?  Yes   Are you worried about losing your housing? No   Lack of transportation? No   Unable to get utilities (heat,electricity)? No         5/2/2024   Dental   Dentist two times every year? Yes         5/2/2024   TB Screening   Were you born outside of the [place]? No     Today's PHQ-2 Score:       5/2/2024    10:53 AM   PHQ-2 ( 1999 [employer])   Q1: Little interest or pleasure in doing things 1   Q2: Feeling down, depressed or hopeless 1   PHQ-2 Score 2   Q1: Little interest or pleasure in doing things Several days   Q2: Feeling down, depressed or hopeless Several days   PHQ-2 Score 2           5/2/2024   Substance Use   Alcohol more than 3/day or more than 7/wk No   Do you use any other substances recreationally? No     Social History     Tobacco Use    Smoking status: Never    Smokeless tobacco: Never   Vaping Use    Vaping status: Never Used   Substance Use Topics    Alcohol use: Yes     Alcohol/week: 1.0 - 3.0 standard drink of alcohol     Comment: socially    Drug use: No           7/10/2023   LAST FHS-7 RESULTS   1st degree relative breast or ovarian cancer No   Any relative bilateral breast cancer No   Any male have breast cancer No   Any ONE woman have BOTH breast AND ovarian cancer No   Any woman with breast cancer before 50yrs No   2 or more relatives with breast AND/OR ovarian cancer No   2 or more relatives with breast AND/OR bowel cancer No        Mammogram Screening - Mammogram every 1-2 years updated in Health Maintenance based on mutual decision  "making        5/2/2024   STI Screening   New sexual partner(s) since last STI/HIV test? No     History of abnormal Pap smear: NO - age 30-65 PAP every 5 years with negative HPV co-testing recommended        Latest Ref Rng & Units 12/20/2018    11:47 AM 12/20/2018    11:05 AM 7/18/2013    12:00 AM   PAP / HPV   PAP (Historical)   NIL  NIL    HPV 16 DNA NEG^Negative Negative      HPV 18 DNA NEG^Negative Negative      Other HR HPV NEG^Negative Negative        ASCVD Risk   The 10-year ASCVD risk score (Lizett PEREZ, et al., 2019) is: 0.3%    Values used to calculate the score:      Age: 49 years      Sex: Female      Is Non- : No      Diabetic: No      Tobacco smoker: No      Systolic Blood Pressure: 119 mmHg      Is BP treated: No      HDL Cholesterol: 98 mg/dL      Total Cholesterol: 180 mg/dL        5/2/2024   Contraception/Family Planning   Questions about contraception or family planning No        Reviewed and updated as needed this visit by Provider   Tobacco     Med Hx  Surg Hx  Fam Hx               Objective    Exam  /82 (BP Location: Right arm, Patient Position: Sitting, Cuff Size: Adult Regular)   Pulse 69   Temp 97.8  F (36.6  C) (Temporal)   Resp 20   Ht 1.72 m (5' 7.72\")   Wt 63.5 kg (140 lb)   SpO2 99%   BMI 21.47 kg/m     Estimated body mass index is 21.47 kg/m  as calculated from the following:    Height as of this encounter: 1.72 m (5' 7.72\").    Weight as of this encounter: 63.5 kg (140 lb).    Physical Exam  GENERAL: alert and no distress  EYES: Eyes grossly normal to inspection, PERRL and conjunctivae and sclerae normal  HENT: ear canals and TM's normal, nose and mouth without ulcers or lesions  NECK: no adenopathy, no asymmetry, masses, or scars  RESP: lungs clear to auscultation - no rales, rhonchi or wheezes  BREAST: normal without masses, tenderness or nipple discharge and no palpable axillary masses or adenopathy  CV: regular rate and rhythm, normal " S1 S2, no S3 or S4, no murmur, click or rub, no peripheral edema  ABDOMEN: soft, nontender, no hepatosplenomegaly, no masses and bowel sounds normal   (female): External genitalia with approx 4mm papule, urethra normal, vagina normal, cervix normal without lesions. Pap collected.  MS: no gross musculoskeletal defects noted, no edema  SKIN: no suspicious lesions or rashes  NEURO: Normal strength and tone, mentation intact and speech normal  PSYCH: mentation appears normal, affect normal/bright      Signed Electronically by: Sirena Mehta PA-C

## 2024-05-04 LAB
ANION GAP SERPL CALCULATED.3IONS-SCNC: 10 MMOL/L (ref 7–15)
BUN SERPL-MCNC: 15 MG/DL (ref 6–20)
CALCIUM SERPL-MCNC: 9.8 MG/DL (ref 8.6–10)
CHLORIDE SERPL-SCNC: 102 MMOL/L (ref 98–107)
CHOLEST SERPL-MCNC: 197 MG/DL
CREAT SERPL-MCNC: 0.78 MG/DL (ref 0.51–0.95)
DEPRECATED HCO3 PLAS-SCNC: 27 MMOL/L (ref 22–29)
EGFRCR SERPLBLD CKD-EPI 2021: >90 ML/MIN/1.73M2
FASTING STATUS PATIENT QL REPORTED: NO
GLUCOSE SERPL-MCNC: 91 MG/DL (ref 70–99)
HDLC SERPL-MCNC: 101 MG/DL
LDLC SERPL CALC-MCNC: 84 MG/DL
NONHDLC SERPL-MCNC: 96 MG/DL
POTASSIUM SERPL-SCNC: 4.6 MMOL/L (ref 3.4–5.3)
SODIUM SERPL-SCNC: 139 MMOL/L (ref 135–145)
TRIGL SERPL-MCNC: 62 MG/DL

## 2024-05-08 LAB
BKR LAB AP GYN ADEQUACY: NORMAL
BKR LAB AP GYN INTERPRETATION: NORMAL
BKR LAB AP HPV REFLEX: NORMAL
BKR LAB AP PREVIOUS ABNORMAL: NORMAL
PATH REPORT.COMMENTS IMP SPEC: NORMAL
PATH REPORT.COMMENTS IMP SPEC: NORMAL
PATH REPORT.RELEVANT HX SPEC: NORMAL

## 2024-05-11 LAB
HUMAN PAPILLOMA VIRUS 16 DNA: NEGATIVE
HUMAN PAPILLOMA VIRUS 18 DNA: NEGATIVE
HUMAN PAPILLOMA VIRUS FINAL DIAGNOSIS: NORMAL
HUMAN PAPILLOMA VIRUS OTHER HR: NEGATIVE

## 2024-07-20 ENCOUNTER — MYC MEDICAL ADVICE (OUTPATIENT)
Dept: FAMILY MEDICINE | Facility: CLINIC | Age: 49
End: 2024-07-20
Payer: COMMERCIAL

## 2024-07-22 NOTE — TELEPHONE ENCOUNTER
While I see that parotitis has occurred post-COVID, certain parotitis infections can be dangerous so I recommend ED for evaluation  Sirena Mehta PA-C

## 2024-07-22 NOTE — TELEPHONE ENCOUNTER
Spoke with patient in these last 2 days the swelling has gone down. Huddled with LYDIA Kelly who ok'd to not go to the ED at the moment, OK to be seen in clinic. Scheduled follow-up in clinic with  on 7/24/24.    Patient to continue current plan unless notified otherwise. Patient to call and ask to speak to triage nurse if develops any new/worsening symptoms. Patient verbalized understanding and agrees with plan.    Chantelle Maradiaga RN  [facility]

## 2024-07-24 NOTE — TELEPHONE ENCOUNTER
"Writer took incoming call from patient today regarding her \"swollen parotid gland symptoms.\" Patient reported that she has a scheduled appointment with Dr. Deshpande today at 10:40 AM (arrival time), however her symptoms have significantly decreased. The Parotid gland is about a pea size now. She has no symptoms, and is wondering if she still needs to be seen/have a visit today?    Patient only has so many visits per year she could use. Patient have a client from 9:30 AM - 10:30 AM, but OK to leave detailed VM for patient.     Will route patient question to Dr. Deshpande to review and advised.    MACKENZIE BorjasN, RN   [facility] [place]    "

## 2025-04-03 ENCOUNTER — PATIENT OUTREACH (OUTPATIENT)
Dept: CARE COORDINATION | Facility: CLINIC | Age: 50
End: 2025-04-03
Payer: COMMERCIAL

## 2025-05-05 ENCOUNTER — OFFICE VISIT (OUTPATIENT)
Dept: FAMILY MEDICINE | Facility: CLINIC | Age: 50
End: 2025-05-05
Payer: COMMERCIAL

## 2025-05-05 ENCOUNTER — ANCILLARY PROCEDURE (OUTPATIENT)
Dept: GENERAL RADIOLOGY | Facility: CLINIC | Age: 50
End: 2025-05-05
Attending: STUDENT IN AN ORGANIZED HEALTH CARE EDUCATION/TRAINING PROGRAM
Payer: COMMERCIAL

## 2025-05-05 VITALS
BODY MASS INDEX: 22.52 KG/M2 | RESPIRATION RATE: 16 BRPM | DIASTOLIC BLOOD PRESSURE: 80 MMHG | HEIGHT: 68 IN | WEIGHT: 148.6 LBS | HEART RATE: 64 BPM | SYSTOLIC BLOOD PRESSURE: 124 MMHG | OXYGEN SATURATION: 99 % | TEMPERATURE: 98.2 F

## 2025-05-05 DIAGNOSIS — Z00.00 ROUTINE GENERAL MEDICAL EXAMINATION AT A HEALTH CARE FACILITY: Primary | ICD-10-CM

## 2025-05-05 DIAGNOSIS — R92.343 EXTREMELY DENSE TISSUE OF BOTH BREASTS ON MAMMOGRAPHY: ICD-10-CM

## 2025-05-05 DIAGNOSIS — M25.512 CHRONIC LEFT SHOULDER PAIN: ICD-10-CM

## 2025-05-05 DIAGNOSIS — G89.29 CHRONIC LEFT SHOULDER PAIN: ICD-10-CM

## 2025-05-05 DIAGNOSIS — Z12.83 SKIN CANCER SCREENING: ICD-10-CM

## 2025-05-05 DIAGNOSIS — Z12.11 SCREEN FOR COLON CANCER: ICD-10-CM

## 2025-05-05 DIAGNOSIS — F31.81 BIPOLAR II DISORDER (H): ICD-10-CM

## 2025-05-05 DIAGNOSIS — L98.9 SKIN LESION OF FACE: ICD-10-CM

## 2025-05-05 DIAGNOSIS — Z12.31 VISIT FOR SCREENING MAMMOGRAM: ICD-10-CM

## 2025-05-05 PROCEDURE — 3079F DIAST BP 80-89 MM HG: CPT | Performed by: STUDENT IN AN ORGANIZED HEALTH CARE EDUCATION/TRAINING PROGRAM

## 2025-05-05 PROCEDURE — G2211 COMPLEX E/M VISIT ADD ON: HCPCS | Performed by: STUDENT IN AN ORGANIZED HEALTH CARE EDUCATION/TRAINING PROGRAM

## 2025-05-05 PROCEDURE — 73030 X-RAY EXAM OF SHOULDER: CPT | Mod: TC | Performed by: RADIOLOGY

## 2025-05-05 PROCEDURE — 99396 PREV VISIT EST AGE 40-64: CPT | Mod: 25 | Performed by: STUDENT IN AN ORGANIZED HEALTH CARE EDUCATION/TRAINING PROGRAM

## 2025-05-05 PROCEDURE — 3074F SYST BP LT 130 MM HG: CPT | Performed by: STUDENT IN AN ORGANIZED HEALTH CARE EDUCATION/TRAINING PROGRAM

## 2025-05-05 PROCEDURE — 99214 OFFICE O/P EST MOD 30 MIN: CPT | Mod: 25 | Performed by: STUDENT IN AN ORGANIZED HEALTH CARE EDUCATION/TRAINING PROGRAM

## 2025-05-05 PROCEDURE — 1126F AMNT PAIN NOTED NONE PRSNT: CPT | Performed by: STUDENT IN AN ORGANIZED HEALTH CARE EDUCATION/TRAINING PROGRAM

## 2025-05-05 SDOH — HEALTH STABILITY: PHYSICAL HEALTH: ON AVERAGE, HOW MANY DAYS PER WEEK DO YOU ENGAGE IN MODERATE TO STRENUOUS EXERCISE (LIKE A BRISK WALK)?: 5 DAYS

## 2025-05-05 SDOH — HEALTH STABILITY: PHYSICAL HEALTH: ON AVERAGE, HOW MANY MINUTES DO YOU ENGAGE IN EXERCISE AT THIS LEVEL?: 50 MIN

## 2025-05-05 ASSESSMENT — PAIN SCALES - GENERAL: PAINLEVEL_OUTOF10: NO PAIN (0)

## 2025-05-05 ASSESSMENT — SOCIAL DETERMINANTS OF HEALTH (SDOH): HOW OFTEN DO YOU GET TOGETHER WITH FRIENDS OR RELATIVES?: ONCE A WEEK

## 2025-05-05 NOTE — PATIENT INSTRUCTIONS
Patient Education   Preventive Care Advice   This is general advice given by our system to help you stay healthy. However, your care team may have specific advice just for you. Please talk to your care team about your preventive care needs.  Nutrition  Eat 5 or more servings of fruits and vegetables each day.  Try wheat bread, brown rice and whole grain pasta (instead of white bread, rice, and pasta).  Get enough calcium and vitamin D. Check the label on foods and aim for 100% of the RDA (recommended daily allowance).  Lifestyle  Exercise at least 150 minutes each week  (30 minutes a day, 5 days a week).  Do muscle strengthening activities 2 days a week. These help control your weight and prevent disease.  No smoking.  Wear sunscreen to prevent skin cancer.  Have a dental exam and cleaning every 6 months.  Yearly exams  See your health care team every year to talk about:  Any changes in your health.  Any medicines your care team has prescribed.  Preventive care, family planning, and ways to prevent chronic diseases.  Shots (vaccines)   HPV shots (up to age 26), if you've never had them before.  Hepatitis B shots (up to age 59), if you've never had them before.  COVID-19 shot: Get this shot when it's due.  Flu shot: Get a flu shot every year.  Tetanus shot: Get a tetanus shot every 10 years.  Pneumococcal, hepatitis A, and RSV shots: Ask your care team if you need these based on your risk.  Shingles shot (for age 50 and up)  General health tests  Diabetes screening:  Starting at age 35, Get screened for diabetes at least every 3 years.  If you are younger than age 35, ask your care team if you should be screened for diabetes.  Cholesterol test: At age 39, start having a cholesterol test every 5 years, or more often if advised.  Bone density scan (DEXA): At age 50, ask your care team if you should have this scan for osteoporosis (brittle bones).  Hepatitis C: Get tested at least once in your life.  STIs (sexually  transmitted infections)  Before age 24: Ask your care team if you should be screened for STIs.  After age 24: Get screened for STIs if you're at risk. You are at risk for STIs (including HIV) if:  You are sexually active with more than one person.  You don't use condoms every time.  You or a partner was diagnosed with a sexually transmitted infection.  If you are at risk for HIV, ask about PrEP medicine to prevent HIV.  Get tested for HIV at least once in your life, whether you are at risk for HIV or not.  Cancer screening tests  Cervical cancer screening: If you have a cervix, begin getting regular cervical cancer screening tests starting at age 21.  Breast cancer scan (mammogram): If you've ever had breasts, begin having regular mammograms starting at age 40. This is a scan to check for breast cancer.  Colon cancer screening: It is important to start screening for colon cancer at age 45.  Have a colonoscopy test every 10 years (or more often if you're at risk) Or, ask your provider about stool tests like a FIT test every year or Cologuard test every 3 years.  To learn more about your testing options, visit:   .  For help making a decision, visit:   https://bit.ly/im53778.  Prostate cancer screening test: If you have a prostate, ask your care team if a prostate cancer screening test (PSA) at age 55 is right for you.  Lung cancer screening: If you are a current or former smoker ages 50 to 80, ask your care team if ongoing lung cancer screenings are right for you.  For informational purposes only. Not to replace the advice of your health care provider. Copyright   2023 [employer]. All rights reserved. Clinically reviewed by the [facility]. [employer] [id number] - REV 01/24.

## 2025-05-05 NOTE — PROGRESS NOTES
Preventive Care Visit  [facility]  Sg Loya PA-C, Physician Assistant - Medical  May 5, 2025      Assessment & Plan     Routine general medical examination at a health care facility  Immunizations: UTD other than hepatitis B which she declines.   STI Screening: Declined.   BP: /80.  Hearing and Vision: No concerns   Preventative Labs: Normal lab panel last year including Lipid Panel, CBC, and BMP. Patient would like to defer labs this year.   Skin Cancer: See below.   Colon Cancer:   - Next due 7/2025 - cologuard ordered.   Lung Cancer: Never smoker, N/A.  Osteoporosis: Not due.   Contraception: Mirena IUD placed 1/2019.   Cervical Cancer Screening:   - Next due 5/2029.  Breast Cancer Screening:   - History of dense breast tissue -> Ordered Mammo and Ultrasound for screening and will have radiology review.   - Recommended regular self breast exams.     Discussed getting at least 150 minutes of aerobic exercise weekly, healthy diet with focus on 4-5 serving of fruits/veggies, lean meat, and reducing saturated fats/sugars, and getting 7-8 hours of sleep nightly (should feel rested when waking up or not getting enough).    - REVIEW OF HEALTH MAINTENANCE PROTOCOL ORDERS    Visit for screening mammogram  Extremely dense tissue of both breasts on mammography  - MA Screen Bilateral w/Chirag; Future  - US Breast Bilateral Limited 1-3 Quadrants; Future    Screen for colon cancer  - COLOGUARD([employer]); Future    Skin lesion of face  Skin cancer screening  With atypical skin lesion of left temple, will refer to dermatology ([facility]) for for evaluation and full skin screening.   Otherwise, recommended regular self exams and utilization of sunscreen SPF 50 when outside with reapplication every 2 hours.    - Adult Dermatology  Referral; Future    Chronic left shoulder pain  Based on exam today, some concern for possible labral tear. Rotator cuff seems intact with  only mild pain with forward flexion and external rotation.   Get left shoulder x-ray and refer to sports medicine for further evaluation.     - Orthopedic  Referral; Future  - XR Shoulder Left G/E 3 Views; Future    Bipolar Disorder  Stable with no acute concerns.     Patient has been advised of split billing requirements and indicates understanding: Yes        Counseling  Appropriate preventive services were addressed with this patient via screening, questionnaire, or discussion as appropriate for fall prevention, nutrition, physical activity, Tobacco-use cessation, social engagement, weight loss and cognition.  Checklist reviewing preventive services available has been given to the patient.  Reviewed patient's diet, addressing concerns and/or questions.       The longitudinal plan of care for the diagnosis(es)/condition(s) as documented were addressed during this visit. Due to the added complexity in care, I will continue to support Samantha in the subsequent management and with ongoing continuity of care.        Subjective   Samantha is a 50 year old, presenting for the following:  Physical        5/5/2025    11:27 AM   Additional Questions   Roomed by Ramon   Accompanied by Self          HPI  Here for preventative visit.     Skin colored papule of left temple that intermittently gets red and scabs. No change in size. No bleeding. No personal or family history of skin cancer.     Left rotator cuff muscle injury 2 months ago. Injured by holding the cave wall while left arm was extended to stabilize herself while she was free climbing. Full range of motion is hard and painful at times but no weakness.           Advance Care Planning    Discussed advance care planning with patient; informed AVS has link to [employer] [employer].        5/5/2025   General Health   How would you rate your overall physical health? Excellent   Feel stress (tense, anxious, or unable to sleep) Only a little   (!) STRESS CONCERN      5/5/2025    Nutrition   Three or more servings of calcium each day? Yes   Diet: Regular (no restrictions)   How many servings of fruit and vegetables per day? 4 or more   How many sweetened beverages each day? 0-1         5/5/2025   Exercise   Days per week of moderate/strenous exercise 5 days   Average minutes spent exercising at this level 50 min         5/5/2025   Social Factors   Frequency of gathering with friends or relatives Once a week   Worry food won't last until get money to buy more No   Food not last or not have enough money for food? No   Do you have housing? (Housing is defined as stable permanent housing and does not include staying outside in a car, in a tent, in an abandoned building, in an overnight shelter, or couch-surfing.) Yes   Are you worried about losing your housing? No   Lack of transportation? No   Unable to get utilities (heat,electricity)? No         5/5/2025   Dental   Dentist two times every year? Yes         Today's PHQ-2 Score:       5/5/2025    10:19 AM   PHQ-2 ( 1999 [employer])   Q1: Little interest or pleasure in doing things 1   Q2: Feeling down, depressed or hopeless 0   PHQ-2 Score 1    Q1: Little interest or pleasure in doing things Several days   Q2: Feeling down, depressed or hopeless Not at all   PHQ-2 Score 1       Patient-reported           5/5/2025   Substance Use   Alcohol more than 3/day or more than 7/wk Not Applicable   Do you use any other substances recreationally? No     Social History     Tobacco Use    Smoking status: Never    Smokeless tobacco: Never   Vaping Use    Vaping status: Never Used   Substance Use Topics    Alcohol use: Yes     Alcohol/week: 1.0 - 3.0 standard drink of alcohol     Comment: socially    Drug use: No           7/10/2023   LAST FHS-7 RESULTS   1st degree relative breast or ovarian cancer No   Any relative bilateral breast cancer No   Any male have breast cancer No   Any ONE woman have BOTH breast AND ovarian cancer No   Any woman with breast cancer  "before 50yrs No   2 or more relatives with breast AND/OR ovarian cancer No   2 or more relatives with breast AND/OR bowel cancer No        Mammogram Screening - Mammogram every 1-2 years updated in Health Maintenance based on mutual decision making        5/5/2025   STI Screening   New sexual partner(s) since last STI/HIV test? No     History of abnormal Pap smear: No - age 30- 64 PAP with HPV every 5 years recommended        Latest Ref Rng & Units 5/3/2024     4:07 PM 12/20/2018    11:47 AM 12/20/2018    11:05 AM   PAP / HPV   PAP  Negative for Intraepithelial Lesion or Malignancy (NILM)      PAP (Historical)    NIL    HPV 16 DNA Negative Negative  Negative     HPV 18 DNA Negative Negative  Negative     Other HR HPV Negative Negative  Negative       ASCVD Risk   The ASCVD Risk score (Lizett PEREZ, et al., 2019) failed to calculate for the following reasons:    The valid HDL cholesterol range is 20 to 100 mg/dL        5/5/2025   Contraception/Family Planning   Questions about contraception or family planning No        Reviewed and updated as needed this visit by Provider   Tobacco  Allergies  Meds  Problems  Med Hx  Surg Hx  Fam Hx     Sexual Activity                   Objective    Exam  /80 (BP Location: Right arm, Patient Position: Sitting, Cuff Size: Adult Regular)   Pulse 64   Temp 98.2  F (36.8  C) (Temporal)   Resp 16   Ht 1.727 m (5' 8\")   Wt 67.4 kg (148 lb 9.6 oz)   SpO2 99%   BMI 22.59 kg/m     Estimated body mass index is 22.59 kg/m  as calculated from the following:    Height as of this encounter: 1.727 m (5' 8\").    Weight as of this encounter: 67.4 kg (148 lb 9.6 oz).    Physical Exam  GENERAL: alert and no distress  EYES: Eyes grossly normal to inspection, PERRL and conjunctivae and sclerae normal  HENT: ear canals and TM's normal, nose and mouth without ulcers or lesions  NECK: no adenopathy, no asymmetry, masses, or scars. No thyromegaly or nodules palpated.  RESP: lungs " clear to auscultation - no rales, rhonchi or wheezes  CV: regular rate and rhythm, normal S1 S2, no S3 or S4, no murmur, click or rub, no peripheral edema  ABDOMEN: soft, nontender, no hepatosplenomegaly, no masses and bowel sounds normal  Left Shoulder  Inspection: No bony/muscle deformities, erythema, ecchymosis, or muscle atrophy.   Palpation: No pain with palpation over clavicle, AC joint, coracoid process, acromion, scapula, and bicipital groove  ROM: Full ROM with external rotation, internal rotation, forward flexion, abduction, and cross-body adduction. Some pain with full forward flexion and external rotation.   Strength: Good strength with external rotation, internal rotation, forward flexion, abduction, and cross-body adduction.   Moody Impingement Test: Negative.  Neer's Test: Positive with mild pain.  Empty Can Supraspinatus Test: Negative.  O'Kevin's Test: Positive.  Speed's Test: Negative.  Lift Off Subscapularus Test: Negative.  Examination of the contralateral shoulder is negative for abnormal findings.   Extremity: Neurovascularuly intact. Motor and sensory function of upper extremities are intact with no apparent deficits. Triceps reflexes 2+ bilaterally. Radial pulse 2+ bilaterally.   SKIN: Focused exam of face completed - atypical papule of left temple.  NEURO: Normal strength and tone, mentation intact and speech normal  PSYCH: mentation appears normal, affect normal/bright        Signed Electronically by: Sg Loya PA-C

## 2025-05-06 ENCOUNTER — PATIENT OUTREACH (OUTPATIENT)
Dept: CARE COORDINATION | Facility: CLINIC | Age: 50
End: 2025-05-06
Payer: COMMERCIAL

## 2025-05-07 ENCOUNTER — RESULTS FOLLOW-UP (OUTPATIENT)
Dept: FAMILY MEDICINE | Facility: CLINIC | Age: 50
End: 2025-05-07

## 2025-05-07 ENCOUNTER — ANCILLARY PROCEDURE (OUTPATIENT)
Dept: MAMMOGRAPHY | Facility: CLINIC | Age: 50
End: 2025-05-07
Attending: STUDENT IN AN ORGANIZED HEALTH CARE EDUCATION/TRAINING PROGRAM
Payer: COMMERCIAL

## 2025-05-07 DIAGNOSIS — R92.343 EXTREMELY DENSE TISSUE OF BOTH BREASTS ON MAMMOGRAPHY: ICD-10-CM

## 2025-05-07 DIAGNOSIS — Z12.31 VISIT FOR SCREENING MAMMOGRAM: ICD-10-CM

## 2025-05-07 PROCEDURE — 77067 SCR MAMMO BI INCL CAD: CPT | Performed by: RADIOLOGY

## 2025-05-07 PROCEDURE — 77063 BREAST TOMOSYNTHESIS BI: CPT | Performed by: RADIOLOGY

## 2025-05-07 NOTE — RESULT ENCOUNTER NOTE
Dear Samantha,     Your shoulder x-ray was normal with no signs of fracture or other abnormality. I recommend continuing to see the sports medicine team as we discussed at your appointment.     Let me know if you have any questions!     Best Regards,   Sg Loya PA-C  [facility]

## 2025-05-12 ENCOUNTER — OFFICE VISIT (OUTPATIENT)
Dept: ORTHOPEDICS | Facility: CLINIC | Age: 50
End: 2025-05-12
Attending: STUDENT IN AN ORGANIZED HEALTH CARE EDUCATION/TRAINING PROGRAM
Payer: COMMERCIAL

## 2025-05-12 DIAGNOSIS — M35.7 SHOULDER JOINT HYPERMOBILITY: Primary | ICD-10-CM

## 2025-05-12 DIAGNOSIS — G89.29 CHRONIC LEFT SHOULDER PAIN: ICD-10-CM

## 2025-05-12 DIAGNOSIS — M25.512 CHRONIC LEFT SHOULDER PAIN: ICD-10-CM

## 2025-05-12 PROCEDURE — 99203 OFFICE O/P NEW LOW 30 MIN: CPT | Performed by: STUDENT IN AN ORGANIZED HEALTH CARE EDUCATION/TRAINING PROGRAM

## 2025-05-12 NOTE — PATIENT INSTRUCTIONS
Schedule PT   If no improvement in 3-4 weeks let me know and we can talk about other options and consider short term bracing

## 2025-05-12 NOTE — LETTER
"5/12/2025      Samantha Meier  [address]  [place] [address]      Dear Colleague,    Thank you for referring your patient, Samantha Meier, to the [facility]. Please see a copy of my visit note below.    ASSESSMENT & PLAN    Diagnoses and all orders for this visit:    Shoulder joint hypermobility  -     Sports Med Adult Follow-Up Clinic Order (As Needed); Future    Chronic left shoulder pain  -     Orthopedic  Referral  -     Ankle/Knee Bracing Supplies Order Hinged Knee Brace; Left  -     Physical Therapy  Referral; Future  -     Sports Med Adult Follow-Up Clinic Order (As Needed); Future      Patient presents with chronic left shoulder pain due to hypermobility.  When she gets in particular positions she feels instability.  Recommend starting some PT to work on stability.  If still feeling unstable could consider some short-term bracing so she is able to continue her activities.              Micheal Azar MD  [facility]    -----------------------------------------------------------------------------------------      SUBJECTIVE  Samantha Meier is a/an RHD 50 year old who has left shoulder pain. Patient references she is a  and has been doing some shoulder rehabilitation after her injury. She feels some shoulder instability with some awkward positions such as IR and reaching behind.     Reason for Visit:  Injured/painful/body part: Left shoulder  What are your symptoms: \"Pulling away\" sensation  Date of injury/Onset: End of February  Cause: Patient describes injury as reaching overhead with stress/weight.    History of similar pain: None  What makes it better: HEP  What makes it worse: Awkward positions, reaching behind, down dog  What have you done for this problem: HEP, Cold Plunge, Sauna   Previous surgeries: None  Social History/Occupation:       REVIEW OF " SYSTEMS:  Positive ROS was noted in the HPI, otherwise negative.       OBJECTIVE:  Gen: no acute distress  Exam is limited to L shoulder:   Inspection:  Grossly normal w/o bruising or erythema  No open wounds or rashes appreciated  Scapular movement is appropriate    Palpation:  Subacromial space: negative  AC joint:negative  Clavicle: negative  Proximal biceps tendon: negative  Cervical spine/paraspinal muscles: negative  Periscapular muscles: positive  Lateral shoulder:negative  Posterior shoulder: positive    Range of Motion:  Active ROM:  - Forward Flexion: 0-170  - Abduction: 0-170  - External Rotation (elbow at side): 30-60  - Internal Rotation: T10, contralateral side T 10  Passive ROM:  -No significant difference    Strength/Special Testing:  Forward flexion 5/5, no pain elicited  Internal rotation: 5/5, no pain elicited  External rotation: 5/5, no pain elicited  Liftoff:5/5, no pain elicited  Belly press: 5/5, no pain elicited  Neer s: negative  Hawkin s: negative  Empty can: 4/5,  pain elicited  Drop arm negative  O Jeremi s: negative  Speed s: negative  Yergason's test: negative  Cross-arm: negative  Apprehension/Relocation: positive  Sulcus: negative  Load and Shift: positive  Scapular Winging:negative  Spurling: negative    Sensation:  Sensation intact appropriately and symmetrically throughout the upper extremity dermatomes        RADIOLOGY:  Previous imaging reviewed and listed are the impressions: X-ray left shoulder 5/5/2025   IMPRESSION: Normal joint spaces and alignment. No fracture.          Again, thank you for allowing me to participate in the care of your patient.        Sincerely,        Micheal Azar MD    Electronically signed

## 2025-05-12 NOTE — PROGRESS NOTES
"ASSESSMENT & PLAN    Diagnoses and all orders for this visit:    Shoulder joint hypermobility  -     Sports Med Adult Follow-Up Clinic Order (As Needed); Future    Chronic left shoulder pain  -     Orthopedic  Referral  -     Ankle/Knee Bracing Supplies Order Hinged Knee Brace; Left  -     Physical Therapy  Referral; Future  -     Sports Med Adult Follow-Up Clinic Order (As Needed); Future      Patient presents with chronic left shoulder pain due to hypermobility.  When she gets in particular positions she feels instability.  Recommend starting some PT to work on stability.  If still feeling unstable could consider some short-term bracing so she is able to continue her activities.              Micheal Azar MD  [facility]    -----------------------------------------------------------------------------------------      SUBJECTIVE  Samantha Meier is a/an RHD 50 year old who has left shoulder pain. Patient references she is a  and has been doing some shoulder rehabilitation after her injury. She feels some shoulder instability with some awkward positions such as IR and reaching behind.     Reason for Visit:  Injured/painful/body part: Left shoulder  What are your symptoms: \"Pulling away\" sensation  Date of injury/Onset: End of February  Cause: Patient describes injury as reaching overhead with stress/weight.    History of similar pain: None  What makes it better: HEP  What makes it worse: Awkward positions, reaching behind, down dog  What have you done for this problem: HEP, Cold Plunge, Sauna   Previous surgeries: None  Social History/Occupation:       REVIEW OF SYSTEMS:  Positive ROS was noted in the HPI, otherwise negative.       OBJECTIVE:  Gen: no acute distress  Exam is limited to L shoulder:   Inspection:  Grossly normal w/o bruising or erythema  No open wounds or rashes appreciated  Scapular movement is " appropriate    Palpation:  Subacromial space: negative  AC joint:negative  Clavicle: negative  Proximal biceps tendon: negative  Cervical spine/paraspinal muscles: negative  Periscapular muscles: positive  Lateral shoulder:negative  Posterior shoulder: positive    Range of Motion:  Active ROM:  - Forward Flexion: 0-170  - Abduction: 0-170  - External Rotation (elbow at side): 30-60  - Internal Rotation: T10, contralateral side T 10  Passive ROM:  -No significant difference    Strength/Special Testing:  Forward flexion 5/5, no pain elicited  Internal rotation: 5/5, no pain elicited  External rotation: 5/5, no pain elicited  Liftoff:5/5, no pain elicited  Belly press: 5/5, no pain elicited  Neer s: negative  Hawkin s: negative  Empty can: 4/5,  pain elicited  Drop arm negative  O Jeremi s: negative  Speed s: negative  Yergason's test: negative  Cross-arm: negative  Apprehension/Relocation: positive  Sulcus: negative  Load and Shift: positive  Scapular Winging:negative  Spurling: negative    Sensation:  Sensation intact appropriately and symmetrically throughout the upper extremity dermatomes        RADIOLOGY:  Previous imaging reviewed and listed are the impressions: X-ray left shoulder 5/5/2025   IMPRESSION: Normal joint spaces and alignment. No fracture.

## 2025-05-19 ASSESSMENT — ACTIVITIES OF DAILY LIVING (ADL)
REMOVING_SOMETHING_FROM_YOUR_BACK_POCKET: 8
WASHING_YOUR_HAIR?: 4
PUSHING_WITH_THE_INVOLVED_ARM: 0
CARRYING_A_HEAVY_OBJECT_OF_10_POUNDS: 0
TOUCHING_THE_BACK_OF_YOUR_NECK: 2
PUTTING_ON_YOUR_PANTS: 1
PUTTING_ON_A_SHIRT_THAT_BUTTONS_DOWN_THE_FRONT: 0
PLEASE_INDICATE_YOR_PRIMARY_REASON_FOR_REFERRAL_TO_THERAPY:: SHOULDER
PUTTING_ON_AN_UNDERSHIRT_OR_A_PULLOVER_SWEATER: 8
WASHING_YOUR_BACK: 10
WHEN_LYING_ON_THE_INVOLVED_SIDE: 8
REACHING_FOR_SOMETHING_ON_A_HIGH_SHELF: 8
PLACING_AN_OBJECT_ON_A_HIGH_SHELF: 8
AT_ITS_WORST?: 5

## 2025-05-21 ENCOUNTER — THERAPY VISIT (OUTPATIENT)
Dept: PHYSICAL THERAPY | Facility: CLINIC | Age: 50
End: 2025-05-21
Payer: COMMERCIAL

## 2025-05-21 DIAGNOSIS — G89.29 CHRONIC LEFT SHOULDER PAIN: ICD-10-CM

## 2025-05-21 DIAGNOSIS — M25.512 CHRONIC LEFT SHOULDER PAIN: ICD-10-CM

## 2025-05-21 PROCEDURE — 97112 NEUROMUSCULAR REEDUCATION: CPT | Mod: GP | Performed by: PHYSICAL THERAPIST

## 2025-05-21 PROCEDURE — 97161 PT EVAL LOW COMPLEX 20 MIN: CPT | Mod: GP | Performed by: PHYSICAL THERAPIST

## 2025-05-21 NOTE — PROGRESS NOTES
PHYSICAL THERAPY EVALUATION  Type of Visit: Evaluation       Fall Risk Screen:  Have you fallen 2 or more times in the past year?: No  Have you fallen and had an injury in the past year?: No    Subjective         Presenting condition or subjective complaint: Shoulder hurts and doesn't have full range of motion  Date of onset: 05/12/25    Relevant medical history:   EDS      Dates & types of surgery:  none    Prior diagnostic imaging/testing results: X-ray     Prior therapy history for the same diagnosis, illness or injury: No        Living Environment  Social support: With a significant other or spouse   Type of home: House   Stairs to enter the home: Yes   Is there a railing: Yes     Ramp: No   Stairs inside the home: Yes   Is there a railing: Yes     Help at home: None  Equipment owned:       Employment: Yes [employer]  Hobbies/Interests:      Patient goals for therapy: Down dog and wash opposite armpit    Pain assessment: See objective evaluation for additional pain details     Objective   SHOULDER EVALUATION  PAIN:   Pain Level at Rest: 0/10  Pain Level with Use: 5/10  Symptom Location: left full shoulder pain along all along deltoid, tightness at upper trap.  Pain Quality: Aching, throbbing   Symptoms are Exacerbated By: reaching overhead, reaching behind back, cross body.  Putting deoderant on to reach across, sleeping on left side  Symptoms are Relieved By: scapular movement with springs or bands - rotator cuff and labral exercises, movement,  scapular assistance when reaching overhead.     Regular exercise:  climber, pilates, running,       POSTURE: WNL    ROM:   (Degrees) Left AROM Right AROM    Shoulder Flexion 145 168   Shoulder Extension 37 60   Shoulder Abduction 132 184   Shoulder Adduction WNL WNL   Shoulder Internal Rotation Not tested WNL   Shoulder External Rotation 42 84   Shoulder Horizontal Adduction Ipsilateral GH joint Even with contralateral GH joint   Shoulder Flexion ER Not  tested due to pain T5   Shoulder Flexion IR L5 T3     STRENGTH:   Pain: - none + mild ++ moderate +++ severe  Strength Scale: 0-5/5 Left Right   Shoulder Flexion 5 5   Shoulder Extension 5 5   Shoulder Abduction 4+ 5-   Shoulder Adduction 5 5   Shoulder Internal Rotation 5 5   Shoulder External Rotation 4+ 5   Lower Trap 4- 4   Serratus Anterior Not formally graded but weakness noted with scapular winging with repeated flexion in pain free ROM 5       Special Tests:    Left Right   Instability     Anterior Load & Shift Positive excess motion, no clunk Negative    Posterior Load & Shift Positive excess motion, no clunk Negative    Multi-Directional Instability      Sulcus Positive Positive           Assessment & Plan   CLINICAL IMPRESSIONS  Medical Diagnosis: chronic left shoulder pain    Treatment Diagnosis: chronic left shoulder pain, hypermobility   Impression/Assessment: Patient is a 50 year old female with left shoulder complaints.  The following significant findings have been identified: Pain, Decreased ROM/flexibility, Decreased joint mobility, Decreased strength, Decreased proprioception, Impaired muscle performance, Decreased activity tolerance, and Impaired posture. These impairments interfere with their ability to perform self care tasks, work tasks, recreational activities, and household chores as compared to previous level of function.     Clinical Decision Making (Complexity):  Clinical Presentation: Stable/Uncomplicated  Clinical Presentation Rationale: based on medical and personal factors listed in PT evaluation  Clinical Decision Making (Complexity): Low complexity    PLAN OF CARE  Treatment Interventions:  Interventions: Manual Therapy, Neuromuscular Re-education, Therapeutic Activity, Therapeutic Exercise, Self-Care/Home Management    Long Term Goals     PT Goal 1  Goal Identifier: reaching  Goal Description: Patient will be able to reach above head, behind back, out to the side and cross body  with full ROM and min to no pain  Rationale: to maximize safety and independence with performance of ADLs and functional tasks;to maximize safety and independence with self cares  Goal Progress: Patient can reach to shoulder level and above with moderate pain  Target Date: 08/14/25      Frequency of Treatment: 1 x per week  Duration of Treatment: 4 weeks tapering to 2 x per month for 2 months    Recommended Referrals to Other Professionals:   Education Assessment:   Learner/Method: Patient;Listening;Reading;Demonstration;Pictures/Video;No Barriers to Learning  Education Comments: Discussed pathoanatomy, PT POC    Risks and benefits of evaluation/treatment have been explained.   Patient/Family/caregiver agrees with Plan of Care.     Evaluation Time:     PT Eval, Low Complexity Minutes (77596): 20       Signing Clinician: Gerda Buchanan PT

## 2025-06-23 ENCOUNTER — THERAPY VISIT (OUTPATIENT)
Dept: PHYSICAL THERAPY | Facility: CLINIC | Age: 50
End: 2025-06-23
Payer: COMMERCIAL

## 2025-06-23 DIAGNOSIS — M25.512 CHRONIC LEFT SHOULDER PAIN: Primary | ICD-10-CM

## 2025-06-23 DIAGNOSIS — G89.29 CHRONIC LEFT SHOULDER PAIN: Primary | ICD-10-CM

## 2025-06-23 PROCEDURE — 97530 THERAPEUTIC ACTIVITIES: CPT | Mod: GP | Performed by: PHYSICAL THERAPIST

## 2025-06-23 PROCEDURE — 97140 MANUAL THERAPY 1/> REGIONS: CPT | Mod: GP | Performed by: PHYSICAL THERAPIST

## 2025-06-23 PROCEDURE — 97110 THERAPEUTIC EXERCISES: CPT | Mod: GP | Performed by: PHYSICAL THERAPIST

## 2025-07-05 ENCOUNTER — ORDERS ONLY (AUTO-RELEASED) (OUTPATIENT)
Dept: FAMILY MEDICINE | Facility: CLINIC | Age: 50
End: 2025-07-05
Payer: COMMERCIAL

## 2025-07-05 DIAGNOSIS — Z12.11 SCREEN FOR COLON CANCER: ICD-10-CM

## 2025-07-07 ENCOUNTER — THERAPY VISIT (OUTPATIENT)
Dept: PHYSICAL THERAPY | Facility: CLINIC | Age: 50
End: 2025-07-07
Payer: COMMERCIAL

## 2025-07-07 DIAGNOSIS — M25.512 CHRONIC LEFT SHOULDER PAIN: Primary | ICD-10-CM

## 2025-07-07 DIAGNOSIS — G89.29 CHRONIC LEFT SHOULDER PAIN: Primary | ICD-10-CM

## 2025-07-07 PROCEDURE — 97530 THERAPEUTIC ACTIVITIES: CPT | Mod: GP | Performed by: PHYSICAL THERAPIST

## 2025-07-07 PROCEDURE — 97110 THERAPEUTIC EXERCISES: CPT | Mod: GP | Performed by: PHYSICAL THERAPIST

## 2025-07-07 PROCEDURE — 97140 MANUAL THERAPY 1/> REGIONS: CPT | Mod: GP | Performed by: PHYSICAL THERAPIST
